# Patient Record
Sex: FEMALE | Race: WHITE | NOT HISPANIC OR LATINO | Employment: OTHER | URBAN - METROPOLITAN AREA
[De-identification: names, ages, dates, MRNs, and addresses within clinical notes are randomized per-mention and may not be internally consistent; named-entity substitution may affect disease eponyms.]

---

## 2017-02-05 ENCOUNTER — COMMUNICATION - HEALTHEAST (OUTPATIENT)
Dept: INTERNAL MEDICINE | Facility: CLINIC | Age: 82
End: 2017-02-05

## 2017-02-05 DIAGNOSIS — E78.5 HYPERLIPIDEMIA: ICD-10-CM

## 2017-02-08 ENCOUNTER — COMMUNICATION - HEALTHEAST (OUTPATIENT)
Dept: INTERNAL MEDICINE | Facility: CLINIC | Age: 82
End: 2017-02-08

## 2017-02-08 DIAGNOSIS — F41.9 ANXIETY: ICD-10-CM

## 2017-02-15 ENCOUNTER — COMMUNICATION - HEALTHEAST (OUTPATIENT)
Dept: INTERNAL MEDICINE | Facility: CLINIC | Age: 82
End: 2017-02-15

## 2017-02-15 ENCOUNTER — OFFICE VISIT - HEALTHEAST (OUTPATIENT)
Dept: INTERNAL MEDICINE | Facility: CLINIC | Age: 82
End: 2017-02-15

## 2017-02-15 DIAGNOSIS — Z95.1 H/O CORONARY ARTERY BYPASS SURGERY: ICD-10-CM

## 2017-02-15 DIAGNOSIS — F41.1 ANXIETY STATE: ICD-10-CM

## 2017-02-15 DIAGNOSIS — I10 BENIGN ESSENTIAL HTN: ICD-10-CM

## 2017-02-15 DIAGNOSIS — F32.5 MAJOR DEPRESSION IN FULL REMISSION (H): ICD-10-CM

## 2017-02-15 DIAGNOSIS — E78.5 HYPERLIPEMIA: ICD-10-CM

## 2017-02-15 LAB
CHOLEST SERPL-MCNC: 184 MG/DL
FASTING STATUS PATIENT QL REPORTED: NORMAL
HDLC SERPL-MCNC: 56 MG/DL
LDLC SERPL CALC-MCNC: 99 MG/DL
TRIGL SERPL-MCNC: 143 MG/DL

## 2017-02-15 ASSESSMENT — MIFFLIN-ST. JEOR: SCORE: 1279.83

## 2017-05-03 ENCOUNTER — COMMUNICATION - HEALTHEAST (OUTPATIENT)
Dept: INTERNAL MEDICINE | Facility: CLINIC | Age: 82
End: 2017-05-03

## 2017-05-03 DIAGNOSIS — F41.9 ANXIETY: ICD-10-CM

## 2017-06-16 ENCOUNTER — COMMUNICATION - HEALTHEAST (OUTPATIENT)
Dept: INTERNAL MEDICINE | Facility: CLINIC | Age: 82
End: 2017-06-16

## 2017-06-16 DIAGNOSIS — F32.5 MAJOR DEPRESSION IN FULL REMISSION (H): ICD-10-CM

## 2017-07-06 ENCOUNTER — COMMUNICATION - HEALTHEAST (OUTPATIENT)
Dept: INTERNAL MEDICINE | Facility: CLINIC | Age: 82
End: 2017-07-06

## 2017-07-06 DIAGNOSIS — I10 HTN (HYPERTENSION): ICD-10-CM

## 2017-08-09 ENCOUNTER — OFFICE VISIT - HEALTHEAST (OUTPATIENT)
Dept: INTERNAL MEDICINE | Facility: CLINIC | Age: 82
End: 2017-08-09

## 2017-08-09 DIAGNOSIS — Z95.1 H/O CORONARY ARTERY BYPASS SURGERY: ICD-10-CM

## 2017-08-09 DIAGNOSIS — E78.5 HYPERLIPEMIA: ICD-10-CM

## 2017-08-09 DIAGNOSIS — I10 ESSENTIAL HYPERTENSION WITH GOAL BLOOD PRESSURE LESS THAN 140/90: ICD-10-CM

## 2017-08-09 ASSESSMENT — MIFFLIN-ST. JEOR: SCORE: 1184.58

## 2017-08-10 ENCOUNTER — COMMUNICATION - HEALTHEAST (OUTPATIENT)
Dept: INTERNAL MEDICINE | Facility: CLINIC | Age: 82
End: 2017-08-10

## 2017-08-10 DIAGNOSIS — F41.9 ANXIETY: ICD-10-CM

## 2017-11-01 ENCOUNTER — COMMUNICATION - HEALTHEAST (OUTPATIENT)
Dept: INTERNAL MEDICINE | Facility: CLINIC | Age: 82
End: 2017-11-01

## 2017-11-01 DIAGNOSIS — E78.5 HYPERLIPIDEMIA: ICD-10-CM

## 2017-11-04 ENCOUNTER — COMMUNICATION - HEALTHEAST (OUTPATIENT)
Dept: INTERNAL MEDICINE | Facility: CLINIC | Age: 82
End: 2017-11-04

## 2017-11-04 DIAGNOSIS — F41.9 ANXIETY: ICD-10-CM

## 2017-12-02 ENCOUNTER — COMMUNICATION - HEALTHEAST (OUTPATIENT)
Dept: INTERNAL MEDICINE | Facility: CLINIC | Age: 82
End: 2017-12-02

## 2017-12-02 DIAGNOSIS — I10 HTN (HYPERTENSION): ICD-10-CM

## 2018-02-01 ENCOUNTER — COMMUNICATION - HEALTHEAST (OUTPATIENT)
Dept: INTERNAL MEDICINE | Facility: CLINIC | Age: 83
End: 2018-02-01

## 2018-02-01 DIAGNOSIS — F41.9 ANXIETY: ICD-10-CM

## 2018-03-03 ENCOUNTER — COMMUNICATION - HEALTHEAST (OUTPATIENT)
Dept: INTERNAL MEDICINE | Facility: CLINIC | Age: 83
End: 2018-03-03

## 2018-03-03 DIAGNOSIS — I10 HTN (HYPERTENSION): ICD-10-CM

## 2018-03-20 ENCOUNTER — COMMUNICATION - HEALTHEAST (OUTPATIENT)
Dept: INTERNAL MEDICINE | Facility: CLINIC | Age: 83
End: 2018-03-20

## 2018-03-20 DIAGNOSIS — F32.5 MAJOR DEPRESSION IN FULL REMISSION (H): ICD-10-CM

## 2018-04-24 ENCOUNTER — COMMUNICATION - HEALTHEAST (OUTPATIENT)
Dept: INTERNAL MEDICINE | Facility: CLINIC | Age: 83
End: 2018-04-24

## 2018-04-24 DIAGNOSIS — F41.9 ANXIETY: ICD-10-CM

## 2018-05-14 ENCOUNTER — OFFICE VISIT - HEALTHEAST (OUTPATIENT)
Dept: INTERNAL MEDICINE | Facility: CLINIC | Age: 83
End: 2018-05-14

## 2018-05-14 DIAGNOSIS — I10 HTN (HYPERTENSION): ICD-10-CM

## 2018-05-14 DIAGNOSIS — F41.9 ANXIETY: ICD-10-CM

## 2018-05-14 DIAGNOSIS — I25.10 ASCVD (ARTERIOSCLEROTIC CARDIOVASCULAR DISEASE): ICD-10-CM

## 2018-05-14 DIAGNOSIS — F32.5 MAJOR DEPRESSION IN FULL REMISSION (H): ICD-10-CM

## 2018-05-14 DIAGNOSIS — E78.5 HYPERLIPIDEMIA: ICD-10-CM

## 2018-05-14 LAB
ALBUMIN SERPL-MCNC: 3.7 G/DL (ref 3.5–5)
ALP SERPL-CCNC: 47 U/L (ref 45–120)
ALT SERPL W P-5'-P-CCNC: 12 U/L (ref 0–45)
ANION GAP SERPL CALCULATED.3IONS-SCNC: 10 MMOL/L (ref 5–18)
AST SERPL W P-5'-P-CCNC: 19 U/L (ref 0–40)
BILIRUB SERPL-MCNC: 0.5 MG/DL (ref 0–1)
BUN SERPL-MCNC: 7 MG/DL (ref 8–28)
CALCIUM SERPL-MCNC: 9.4 MG/DL (ref 8.5–10.5)
CHLORIDE BLD-SCNC: 96 MMOL/L (ref 98–107)
CHOLEST SERPL-MCNC: 158 MG/DL
CO2 SERPL-SCNC: 26 MMOL/L (ref 22–31)
CREAT SERPL-MCNC: 0.75 MG/DL (ref 0.6–1.1)
ERYTHROCYTE [DISTWIDTH] IN BLOOD BY AUTOMATED COUNT: 12.3 % (ref 11–14.5)
FASTING STATUS PATIENT QL REPORTED: NORMAL
GFR SERPL CREATININE-BSD FRML MDRD: >60 ML/MIN/1.73M2
GLUCOSE BLD-MCNC: 85 MG/DL (ref 70–125)
HCT VFR BLD AUTO: 41.6 % (ref 35–47)
HDLC SERPL-MCNC: 55 MG/DL
HGB BLD-MCNC: 14.5 G/DL (ref 12–16)
LDLC SERPL CALC-MCNC: 86 MG/DL
MCH RBC QN AUTO: 30.8 PG (ref 27–34)
MCHC RBC AUTO-ENTMCNC: 34.8 G/DL (ref 32–36)
MCV RBC AUTO: 89 FL (ref 80–100)
PLATELET # BLD AUTO: 202 THOU/UL (ref 140–440)
PMV BLD AUTO: 7.4 FL (ref 7–10)
POTASSIUM BLD-SCNC: 4.1 MMOL/L (ref 3.5–5)
PROT SERPL-MCNC: 6.5 G/DL (ref 6–8)
RBC # BLD AUTO: 4.69 MILL/UL (ref 3.8–5.4)
SODIUM SERPL-SCNC: 132 MMOL/L (ref 136–145)
TRIGL SERPL-MCNC: 85 MG/DL
TSH SERPL DL<=0.005 MIU/L-ACNC: 3.67 UIU/ML (ref 0.3–5)
WBC: 6.1 THOU/UL (ref 4–11)

## 2018-05-14 ASSESSMENT — MIFFLIN-ST. JEOR: SCORE: 1254.2

## 2018-05-15 ENCOUNTER — COMMUNICATION - HEALTHEAST (OUTPATIENT)
Dept: INTERNAL MEDICINE | Facility: CLINIC | Age: 83
End: 2018-05-15

## 2018-06-30 ENCOUNTER — COMMUNICATION - HEALTHEAST (OUTPATIENT)
Dept: INTERNAL MEDICINE | Facility: CLINIC | Age: 83
End: 2018-06-30

## 2018-06-30 DIAGNOSIS — I10 HTN (HYPERTENSION): ICD-10-CM

## 2018-07-27 ENCOUNTER — COMMUNICATION - HEALTHEAST (OUTPATIENT)
Dept: INTERNAL MEDICINE | Facility: CLINIC | Age: 83
End: 2018-07-27

## 2018-07-27 DIAGNOSIS — E78.5 HYPERLIPIDEMIA: ICD-10-CM

## 2018-10-24 ENCOUNTER — COMMUNICATION - HEALTHEAST (OUTPATIENT)
Dept: INTERNAL MEDICINE | Facility: CLINIC | Age: 83
End: 2018-10-24

## 2018-10-24 DIAGNOSIS — F41.9 ANXIETY: ICD-10-CM

## 2018-11-14 ENCOUNTER — OFFICE VISIT - HEALTHEAST (OUTPATIENT)
Dept: INTERNAL MEDICINE | Facility: CLINIC | Age: 83
End: 2018-11-14

## 2018-11-14 DIAGNOSIS — E78.49 OTHER HYPERLIPIDEMIA: ICD-10-CM

## 2018-11-14 DIAGNOSIS — F41.1 ANXIETY STATE: ICD-10-CM

## 2018-11-14 DIAGNOSIS — Z95.1 H/O CORONARY ARTERY BYPASS SURGERY: ICD-10-CM

## 2018-11-14 DIAGNOSIS — I10 ESSENTIAL HYPERTENSION: ICD-10-CM

## 2018-11-14 DIAGNOSIS — Z79.899 CONTROLLED SUBSTANCE AGREEMENT SIGNED: ICD-10-CM

## 2018-11-14 ASSESSMENT — MIFFLIN-ST. JEOR: SCORE: 1297.52

## 2019-01-10 ENCOUNTER — OFFICE VISIT - HEALTHEAST (OUTPATIENT)
Dept: INTERNAL MEDICINE | Facility: CLINIC | Age: 84
End: 2019-01-10

## 2019-01-10 DIAGNOSIS — F32.5 MAJOR DEPRESSIVE DISORDER IN FULL REMISSION, UNSPECIFIED WHETHER RECURRENT (H): ICD-10-CM

## 2019-01-10 DIAGNOSIS — Z51.81 MEDICATION MONITORING ENCOUNTER: ICD-10-CM

## 2019-01-10 DIAGNOSIS — F41.9 ANXIETY: ICD-10-CM

## 2019-01-10 DIAGNOSIS — E78.49 OTHER HYPERLIPIDEMIA: ICD-10-CM

## 2019-01-10 DIAGNOSIS — Z86.2 HISTORY OF SARCOIDOSIS: ICD-10-CM

## 2019-01-10 DIAGNOSIS — I10 ESSENTIAL HYPERTENSION: ICD-10-CM

## 2019-01-10 LAB
AMPHETAMINES UR QL SCN: NORMAL
BARBITURATES UR QL: NORMAL
BENZODIAZ UR QL: NORMAL
CANNABINOIDS UR QL SCN: NORMAL
COCAINE UR QL: NORMAL
CREAT UR-MCNC: 107.5 MG/DL
OPIATES UR QL SCN: NORMAL
OXYCODONE UR QL: NORMAL
PCP UR QL SCN: NORMAL

## 2019-01-10 ASSESSMENT — MIFFLIN-ST. JEOR: SCORE: 1293.89

## 2019-01-19 ENCOUNTER — COMMUNICATION - HEALTHEAST (OUTPATIENT)
Dept: SCHEDULING | Facility: CLINIC | Age: 84
End: 2019-01-19

## 2019-01-19 DIAGNOSIS — F41.9 ANXIETY: ICD-10-CM

## 2019-01-21 ENCOUNTER — AMBULATORY - HEALTHEAST (OUTPATIENT)
Dept: INTERNAL MEDICINE | Facility: CLINIC | Age: 84
End: 2019-01-21

## 2019-01-21 DIAGNOSIS — F32.5 MAJOR DEPRESSION IN FULL REMISSION (H): ICD-10-CM

## 2019-01-21 DIAGNOSIS — F41.9 ANXIETY: ICD-10-CM

## 2019-01-23 ENCOUNTER — COMMUNICATION - HEALTHEAST (OUTPATIENT)
Dept: INTERNAL MEDICINE | Facility: CLINIC | Age: 84
End: 2019-01-23

## 2019-01-31 ENCOUNTER — COMMUNICATION - HEALTHEAST (OUTPATIENT)
Dept: INTERNAL MEDICINE | Facility: CLINIC | Age: 84
End: 2019-01-31

## 2019-06-09 ENCOUNTER — COMMUNICATION - HEALTHEAST (OUTPATIENT)
Dept: INTERNAL MEDICINE | Facility: CLINIC | Age: 84
End: 2019-06-09

## 2019-06-09 DIAGNOSIS — F32.5 MAJOR DEPRESSION IN FULL REMISSION (H): ICD-10-CM

## 2019-06-29 ENCOUNTER — COMMUNICATION - HEALTHEAST (OUTPATIENT)
Dept: INTERNAL MEDICINE | Facility: CLINIC | Age: 84
End: 2019-06-29

## 2019-06-29 DIAGNOSIS — I10 HTN (HYPERTENSION): ICD-10-CM

## 2019-07-30 ENCOUNTER — COMMUNICATION - HEALTHEAST (OUTPATIENT)
Dept: INTERNAL MEDICINE | Facility: CLINIC | Age: 84
End: 2019-07-30

## 2019-07-30 DIAGNOSIS — F41.9 ANXIETY: ICD-10-CM

## 2019-10-13 ENCOUNTER — COMMUNICATION - HEALTHEAST (OUTPATIENT)
Dept: INTERNAL MEDICINE | Facility: CLINIC | Age: 84
End: 2019-10-13

## 2019-10-13 DIAGNOSIS — E78.5 HYPERLIPIDEMIA: ICD-10-CM

## 2019-10-22 ENCOUNTER — COMMUNICATION - HEALTHEAST (OUTPATIENT)
Dept: SCHEDULING | Facility: CLINIC | Age: 84
End: 2019-10-22

## 2019-10-22 DIAGNOSIS — F41.9 ANXIETY: ICD-10-CM

## 2019-10-25 ENCOUNTER — COMMUNICATION - HEALTHEAST (OUTPATIENT)
Dept: SCHEDULING | Facility: CLINIC | Age: 84
End: 2019-10-25

## 2019-12-05 ENCOUNTER — COMMUNICATION - HEALTHEAST (OUTPATIENT)
Dept: INTERNAL MEDICINE | Facility: CLINIC | Age: 84
End: 2019-12-05

## 2019-12-05 DIAGNOSIS — F32.5 MAJOR DEPRESSION IN FULL REMISSION (H): ICD-10-CM

## 2019-12-12 ENCOUNTER — COMMUNICATION - HEALTHEAST (OUTPATIENT)
Dept: INTERNAL MEDICINE | Facility: CLINIC | Age: 84
End: 2019-12-12

## 2019-12-12 DIAGNOSIS — I10 HTN (HYPERTENSION): ICD-10-CM

## 2020-01-10 ENCOUNTER — COMMUNICATION - HEALTHEAST (OUTPATIENT)
Dept: INTERNAL MEDICINE | Facility: CLINIC | Age: 85
End: 2020-01-10

## 2020-01-10 DIAGNOSIS — E78.5 HYPERLIPIDEMIA: ICD-10-CM

## 2020-01-22 ENCOUNTER — COMMUNICATION - HEALTHEAST (OUTPATIENT)
Dept: SCHEDULING | Facility: CLINIC | Age: 85
End: 2020-01-22

## 2020-01-22 DIAGNOSIS — F41.9 ANXIETY: ICD-10-CM

## 2020-01-29 ENCOUNTER — OFFICE VISIT - HEALTHEAST (OUTPATIENT)
Dept: INTERNAL MEDICINE | Facility: CLINIC | Age: 85
End: 2020-01-29

## 2020-01-29 DIAGNOSIS — I10 HTN (HYPERTENSION): ICD-10-CM

## 2020-01-29 DIAGNOSIS — Z79.899 MEDICATION MANAGEMENT: ICD-10-CM

## 2020-01-29 DIAGNOSIS — Z79.899 CHRONIC USE OF BENZODIAZEPINE FOR THERAPEUTIC PURPOSE: ICD-10-CM

## 2020-01-29 DIAGNOSIS — F32.5 MAJOR DEPRESSION IN FULL REMISSION (H): ICD-10-CM

## 2020-01-29 LAB
ALBUMIN SERPL-MCNC: 4.2 G/DL (ref 3.5–5)
ALP SERPL-CCNC: 55 U/L (ref 45–120)
ALT SERPL W P-5'-P-CCNC: 24 U/L (ref 0–45)
AMPHETAMINES UR QL SCN: NORMAL
ANION GAP SERPL CALCULATED.3IONS-SCNC: 10 MMOL/L (ref 5–18)
AST SERPL W P-5'-P-CCNC: 25 U/L (ref 0–40)
BARBITURATES UR QL: NORMAL
BASOPHILS # BLD AUTO: 0 THOU/UL (ref 0–0.2)
BASOPHILS NFR BLD AUTO: 1 % (ref 0–2)
BENZODIAZ UR QL: NORMAL
BILIRUB SERPL-MCNC: 0.4 MG/DL (ref 0–1)
BUN SERPL-MCNC: 14 MG/DL (ref 8–28)
CALCIUM SERPL-MCNC: 9.5 MG/DL (ref 8.5–10.5)
CANNABINOIDS UR QL SCN: NORMAL
CHLORIDE BLD-SCNC: 99 MMOL/L (ref 98–107)
CO2 SERPL-SCNC: 28 MMOL/L (ref 22–31)
COCAINE UR QL: NORMAL
CREAT SERPL-MCNC: 0.73 MG/DL (ref 0.6–1.1)
CREAT UR-MCNC: 93.3 MG/DL
EOSINOPHIL # BLD AUTO: 0.1 THOU/UL (ref 0–0.4)
EOSINOPHIL NFR BLD AUTO: 1 % (ref 0–6)
ERYTHROCYTE [DISTWIDTH] IN BLOOD BY AUTOMATED COUNT: 12.3 % (ref 11–14.5)
GFR SERPL CREATININE-BSD FRML MDRD: >60 ML/MIN/1.73M2
GLUCOSE BLD-MCNC: 100 MG/DL (ref 70–125)
HCT VFR BLD AUTO: 44.3 % (ref 35–47)
HGB BLD-MCNC: 15.1 G/DL (ref 12–16)
LYMPHOCYTES # BLD AUTO: 2 THOU/UL (ref 0.8–4.4)
LYMPHOCYTES NFR BLD AUTO: 28 % (ref 20–40)
MCH RBC QN AUTO: 31.3 PG (ref 27–34)
MCHC RBC AUTO-ENTMCNC: 34.1 G/DL (ref 32–36)
MCV RBC AUTO: 92 FL (ref 80–100)
MONOCYTES # BLD AUTO: 0.5 THOU/UL (ref 0–0.9)
MONOCYTES NFR BLD AUTO: 8 % (ref 2–10)
NEUTROPHILS # BLD AUTO: 4.2 THOU/UL (ref 2–7.7)
NEUTROPHILS NFR BLD AUTO: 61 % (ref 50–70)
OPIATES UR QL SCN: NORMAL
OXYCODONE UR QL: NORMAL
PCP UR QL SCN: NORMAL
PLATELET # BLD AUTO: 246 THOU/UL (ref 140–440)
PMV BLD AUTO: 10.2 FL (ref 8.5–12.5)
POTASSIUM BLD-SCNC: 4.3 MMOL/L (ref 3.5–5)
PROT SERPL-MCNC: 7.2 G/DL (ref 6–8)
RBC # BLD AUTO: 4.83 MILL/UL (ref 3.8–5.4)
SODIUM SERPL-SCNC: 137 MMOL/L (ref 136–145)
WBC: 6.9 THOU/UL (ref 4–11)

## 2020-01-29 ASSESSMENT — PATIENT HEALTH QUESTIONNAIRE - PHQ9: SUM OF ALL RESPONSES TO PHQ QUESTIONS 1-9: 0

## 2020-01-29 ASSESSMENT — MIFFLIN-ST. JEOR: SCORE: 1352.86

## 2020-03-16 ENCOUNTER — COMMUNICATION - HEALTHEAST (OUTPATIENT)
Dept: INTERNAL MEDICINE | Facility: CLINIC | Age: 85
End: 2020-03-16

## 2020-03-16 DIAGNOSIS — F32.5 MAJOR DEPRESSION IN FULL REMISSION (H): ICD-10-CM

## 2020-04-15 ENCOUNTER — COMMUNICATION - HEALTHEAST (OUTPATIENT)
Dept: INTERNAL MEDICINE | Facility: CLINIC | Age: 85
End: 2020-04-15

## 2020-04-15 ENCOUNTER — RECORDS - HEALTHEAST (OUTPATIENT)
Dept: SCHEDULING | Facility: CLINIC | Age: 85
End: 2020-04-15

## 2020-04-15 DIAGNOSIS — F41.9 ANXIETY: ICD-10-CM

## 2020-05-17 ENCOUNTER — COMMUNICATION - HEALTHEAST (OUTPATIENT)
Dept: INTERNAL MEDICINE | Facility: CLINIC | Age: 85
End: 2020-05-17

## 2020-05-17 DIAGNOSIS — I10 HTN (HYPERTENSION): ICD-10-CM

## 2020-05-19 RX ORDER — METOPROLOL SUCCINATE 50 MG/1
TABLET, EXTENDED RELEASE ORAL
Qty: 90 TABLET | Refills: 3 | Status: SHIPPED | OUTPATIENT
Start: 2020-05-19 | End: 2021-07-09

## 2020-07-16 ENCOUNTER — COMMUNICATION - HEALTHEAST (OUTPATIENT)
Dept: INTERNAL MEDICINE | Facility: CLINIC | Age: 85
End: 2020-07-16

## 2020-07-16 DIAGNOSIS — F41.9 ANXIETY: ICD-10-CM

## 2020-09-21 ENCOUNTER — RECORDS - HEALTHEAST (OUTPATIENT)
Dept: ADMINISTRATIVE | Facility: OTHER | Age: 85
End: 2020-09-21

## 2020-10-15 ENCOUNTER — COMMUNICATION - HEALTHEAST (OUTPATIENT)
Dept: INTERNAL MEDICINE | Facility: CLINIC | Age: 85
End: 2020-10-15

## 2020-10-15 DIAGNOSIS — F41.9 ANXIETY: ICD-10-CM

## 2020-10-19 ENCOUNTER — OFFICE VISIT - HEALTHEAST (OUTPATIENT)
Dept: INTERNAL MEDICINE | Facility: CLINIC | Age: 85
End: 2020-10-19

## 2020-10-19 DIAGNOSIS — G47.9 SLEEP DISORDER: ICD-10-CM

## 2020-10-19 DIAGNOSIS — F41.9 ANXIETY: ICD-10-CM

## 2020-10-19 RX ORDER — ZOLPIDEM TARTRATE 5 MG/1
5 TABLET ORAL
Qty: 30 TABLET | Refills: 0 | Status: SHIPPED | OUTPATIENT
Start: 2020-10-19 | End: 2022-07-28

## 2020-10-19 ASSESSMENT — PATIENT HEALTH QUESTIONNAIRE - PHQ9: SUM OF ALL RESPONSES TO PHQ QUESTIONS 1-9: 1

## 2020-12-16 ENCOUNTER — COMMUNICATION - HEALTHEAST (OUTPATIENT)
Dept: INTERNAL MEDICINE | Facility: CLINIC | Age: 85
End: 2020-12-16

## 2020-12-16 DIAGNOSIS — E78.5 HYPERLIPIDEMIA: ICD-10-CM

## 2020-12-17 RX ORDER — SIMVASTATIN 20 MG
TABLET ORAL
Qty: 90 TABLET | Refills: 3 | Status: SHIPPED | OUTPATIENT
Start: 2020-12-17 | End: 2022-03-09

## 2021-03-04 ENCOUNTER — AMBULATORY - HEALTHEAST (OUTPATIENT)
Dept: NURSING | Facility: CLINIC | Age: 86
End: 2021-03-04

## 2021-03-25 ENCOUNTER — AMBULATORY - HEALTHEAST (OUTPATIENT)
Dept: NURSING | Facility: CLINIC | Age: 86
End: 2021-03-25

## 2021-04-07 ENCOUNTER — COMMUNICATION - HEALTHEAST (OUTPATIENT)
Dept: INTERNAL MEDICINE | Facility: CLINIC | Age: 86
End: 2021-04-07

## 2021-04-07 DIAGNOSIS — F41.9 ANXIETY: ICD-10-CM

## 2021-04-08 RX ORDER — LORAZEPAM 1 MG/1
TABLET ORAL
Qty: 90 TABLET | Refills: 0 | Status: SHIPPED | OUTPATIENT
Start: 2021-04-08 | End: 2021-07-06

## 2021-04-10 ENCOUNTER — COMMUNICATION - HEALTHEAST (OUTPATIENT)
Dept: INTERNAL MEDICINE | Facility: CLINIC | Age: 86
End: 2021-04-10

## 2021-04-10 DIAGNOSIS — I10 HTN (HYPERTENSION): ICD-10-CM

## 2021-04-12 RX ORDER — HYDROCHLOROTHIAZIDE 25 MG/1
TABLET ORAL
Qty: 90 TABLET | Refills: 3 | Status: SHIPPED | OUTPATIENT
Start: 2021-04-12 | End: 2022-03-09

## 2021-05-26 ASSESSMENT — PATIENT HEALTH QUESTIONNAIRE - PHQ9: SUM OF ALL RESPONSES TO PHQ QUESTIONS 1-9: 0

## 2021-05-27 ASSESSMENT — PATIENT HEALTH QUESTIONNAIRE - PHQ9: SUM OF ALL RESPONSES TO PHQ QUESTIONS 1-9: 1

## 2021-05-29 NOTE — TELEPHONE ENCOUNTER
Refill Approved    Rx renewed per Medication Renewal Policy. Medication was last renewed on 5/14/18, last OV 1/10/19.    Britta Minor, [facility] Triage/Med Refill 6/9/2019     Requested Prescriptions   Pending Prescriptions Disp Refills     sertraline (ZOLOFT) 100 MG tablet [Pharmacy Med Name: SERTRALINE  MG TABLET] 90 tablet 0     Sig: TAKE 1 TABLET BY MOUTH DAILY       SSRI Refill Protocol  Passed - 6/9/2019  9:38 AM        Passed - PCP or prescribing provider visit in last year     Last office visit with prescriber/PCP: 11/14/2018 Lan Mares MD OR same dept: 1/10/2019 Lan Diop MD OR same specialty: 1/10/2019 Lan Diop MD  Last physical: Visit date not found Last MTM visit: Visit date not found   Next visit within 3 mo: Visit date not found  Next physical within 3 mo: Visit date not found  Prescriber OR PCP: Lan Mares MD  Last diagnosis associated with med order: 1. 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

## 2021-05-30 VITALS — WEIGHT: 179.9 LBS | BODY MASS INDEX: 28.91 KG/M2 | HEIGHT: 66 IN

## 2021-05-30 NOTE — TELEPHONE ENCOUNTER
"Controlled Substance Refill Request  Medication Name:   Requested Prescriptions     Pending Prescriptions Disp Refills     LORazepam (ATIVAN) 1 MG tablet 90 tablet 0     Sig: TAKE 1 TABLET (1 MG TOTAL) BY MOUTH DAILY AS NEEDED FOR ANXIETY.     Date Last Fill: 1/23/2018  Pharmacy: [facility] [id number] [facility] - [place], [place] - [address]      Submit electronically to pharmacy  Controlled Substance Agreement Date Scanned:   Encounter-Level CSA Scan Date - 08/09/2017:    Scan on 8/11/2017  1:46 PM (below)             Encounter-Level CSA Scan Date - 08/24/2016:    Scan on 8/30/2016  7:39 AM (below)         Last office visit with prescriber/PCP: 1/10/2019 Lan Diop MD OR same dept: 1/10/2019 Lan Diop MD OR same specialty: 1/10/2019 Lan Diop MD  Last physical: Visit date not found Last MTM visit: Visit date not found          The patient is requesting an expedited refill due to being out of the medication. The patient states that she has called three times for the medication since 7/22/2019 and wants the refill request \"speeded up.\"  "

## 2021-05-30 NOTE — TELEPHONE ENCOUNTER
RN cannot approve Refill Request    RN can NOT refill this medication Protocol failed and NO refill given.       Lore Carrera, [facility] Triage/Med Refill 6/29/2019    Requested Prescriptions   Pending Prescriptions Disp Refills     hydroCHLOROthiazide (HYDRODIURIL) 25 MG tablet [Pharmacy Med Name: HYDROCHLOROTHIAZIDE 25 MG TAB] 90 tablet 3     Sig: TAKE 1 TABLET BY MOUTH EVERY DAY       Diuretics/Combination Diuretics Refill Protocol  Failed - 6/29/2019  8:30 AM        Failed - Serum Potassium in past 12 months      No results found for: LN-POTASSIUM          Failed - Serum Sodium in past 12 months      No results found for: LN-SODIUM          Failed - Serum Creatinine in past 12 months      Creatinine   Date Value Ref Range Status   05/14/2018 0.75 0.60 - 1.10 mg/dL Final             Passed - Visit with PCP or prescribing provider visit in past 12 months     Last office visit with prescriber/PCP: 11/14/2018 Lan Mares MD OR same dept: 1/10/2019 Lan Diop MD OR same specialty: 1/10/2019 Lan Diop MD  Last physical: Visit date not found Last MTM visit: Visit date not found   Next visit within 3 mo: Visit date not found  Next physical within 3 mo: Visit date not found  Prescriber OR PCP: Lan Mares MD  Last diagnosis associated with med order: 1. HTN (hypertension)  - hydroCHLOROthiazide (HYDRODIURIL) 25 MG tablet [Pharmacy Med Name: HYDROCHLOROTHIAZIDE 25 MG TAB]; TAKE 1 TABLET BY MOUTH EVERY DAY  Dispense: 90 tablet; Refill: 3  - metoprolol succinate (TOPROL-XL) 50 MG 24 hr tablet; TAKE 1 TABLET BY MOUTH EVERY DAY  Dispense: 90 tablet; Refill: 1    If protocol passes may refill for 12 months if within 3 months of last provider visit (or a total of 15 months).             Passed - Blood pressure on file in past 12 months     BP Readings from Last 1 Encounters:   01/10/19 138/80           Signed Prescriptions Disp Refills    metoprolol succinate (TOPROL-XL) 50 MG 24 hr tablet 90  tablet 1     Sig: TAKE 1 TABLET BY MOUTH EVERY DAY       Beta-Blockers Refill Protocol Passed - 6/29/2019  8:30 AM        Passed - PCP or prescribing provider visit in past 12 months or next 3 months     Last office visit with prescriber/PCP: 11/14/2018 Lan Mares MD OR same dept: 1/10/2019 Lan Diop MD OR same specialty: 1/10/2019 Lan Diop MD  Last physical: Visit date not found Last MTM visit: Visit date not found   Next visit within 3 mo: Visit date not found  Next physical within 3 mo: Visit date not found  Prescriber OR PCP: Lan Mares MD  Last diagnosis associated with med order: 1. HTN (hypertension)  - hydroCHLOROthiazide (HYDRODIURIL) 25 MG tablet [Pharmacy Med Name: HYDROCHLOROTHIAZIDE 25 MG TAB]; TAKE 1 TABLET BY MOUTH EVERY DAY  Dispense: 90 tablet; Refill: 3  - metoprolol succinate (TOPROL-XL) 50 MG 24 hr tablet; TAKE 1 TABLET BY MOUTH EVERY DAY  Dispense: 90 tablet; Refill: 1    If protocol passes may refill for 12 months if within 3 months of last provider visit (or a total of 15 months).             Passed - Blood pressure filed in past 12 months     BP Readings from Last 1 Encounters:   01/10/19 138/80

## 2021-05-30 NOTE — TELEPHONE ENCOUNTER
Refill Approved    Rx renewed per Medication Renewal Policy. Medication was last renewed on 5/14/18.    Lore Carrera, [facility] Triage/Med Refill 6/29/2019     Requested Prescriptions   Pending Prescriptions Disp Refills     hydroCHLOROthiazide (HYDRODIURIL) 25 MG tablet [Pharmacy Med Name: HYDROCHLOROTHIAZIDE 25 MG TAB] 90 tablet 3     Sig: TAKE 1 TABLET BY MOUTH EVERY DAY       Diuretics/Combination Diuretics Refill Protocol  Failed - 6/29/2019  8:30 AM        Failed - Serum Potassium in past 12 months      No results found for: LN-POTASSIUM          Failed - Serum Sodium in past 12 months      No results found for: LN-SODIUM          Failed - Serum Creatinine in past 12 months      Creatinine   Date Value Ref Range Status   05/14/2018 0.75 0.60 - 1.10 mg/dL Final             Passed - Visit with PCP or prescribing provider visit in past 12 months     Last office visit with prescriber/PCP: 11/14/2018 Lan Mares MD OR same dept: 1/10/2019 Lan Diop MD OR same specialty: 1/10/2019 Lan Diop MD  Last physical: Visit date not found Last MTM visit: Visit date not found   Next visit within 3 mo: Visit date not found  Next physical within 3 mo: Visit date not found  Prescriber OR PCP: Lan Mares MD  Last diagnosis associated with med order: 1. HTN (hypertension)  - hydroCHLOROthiazide (HYDRODIURIL) 25 MG tablet [Pharmacy Med Name: HYDROCHLOROTHIAZIDE 25 MG TAB]; TAKE 1 TABLET BY MOUTH EVERY DAY  Dispense: 90 tablet; Refill: 3  - metoprolol succinate (TOPROL-XL) 50 MG 24 hr tablet [Pharmacy Med Name: METOPROLOL SUCC ER 50 MG TAB]; TAKE 1 TABLET BY MOUTH EVERY DAY  Dispense: 90 tablet; Refill: 3    If protocol passes may refill for 12 months if within 3 months of last provider visit (or a total of 15 months).             Passed - Blood pressure on file in past 12 months     BP Readings from Last 1 Encounters:   01/10/19 138/80             metoprolol succinate (TOPROL-XL) 50 MG 24 hr  tablet [Pharmacy Med Name: METOPROLOL SUCC ER 50 MG TAB] 90 tablet 3     Sig: TAKE 1 TABLET BY MOUTH EVERY DAY       Beta-Blockers Refill Protocol Passed - 6/29/2019  8:30 AM        Passed - PCP or prescribing provider visit in past 12 months or next 3 months     Last office visit with prescriber/PCP: 11/14/2018 Lan Mares MD OR same dept: 1/10/2019 Lan Diop MD OR same specialty: 1/10/2019 Lan Diop MD  Last physical: Visit date not found Last MTM visit: Visit date not found   Next visit within 3 mo: Visit date not found  Next physical within 3 mo: Visit date not found  Prescriber OR PCP: Lan Mares MD  Last diagnosis associated with med order: 1. HTN (hypertension)  - hydroCHLOROthiazide (HYDRODIURIL) 25 MG tablet [Pharmacy Med Name: HYDROCHLOROTHIAZIDE 25 MG TAB]; TAKE 1 TABLET BY MOUTH EVERY DAY  Dispense: 90 tablet; Refill: 3  - metoprolol succinate (TOPROL-XL) 50 MG 24 hr tablet [Pharmacy Med Name: METOPROLOL SUCC ER 50 MG TAB]; TAKE 1 TABLET BY MOUTH EVERY DAY  Dispense: 90 tablet; Refill: 3    If protocol passes may refill for 12 months if within 3 months of last provider visit (or a total of 15 months).             Passed - Blood pressure filed in past 12 months     BP Readings from Last 1 Encounters:   01/10/19 138/80

## 2021-05-31 VITALS — HEIGHT: 66 IN | BODY MASS INDEX: 25.54 KG/M2 | WEIGHT: 158.9 LBS

## 2021-06-01 VITALS — WEIGHT: 174.25 LBS | BODY MASS INDEX: 28 KG/M2 | HEIGHT: 66 IN

## 2021-06-02 VITALS — HEIGHT: 66 IN | WEIGHT: 183.8 LBS | BODY MASS INDEX: 29.54 KG/M2

## 2021-06-02 VITALS — BODY MASS INDEX: 29.41 KG/M2 | WEIGHT: 183 LBS | HEIGHT: 66 IN

## 2021-06-02 NOTE — TELEPHONE ENCOUNTER
Controlled Substance Refill Request  Medication Name:   Requested Prescriptions     Pending Prescriptions Disp Refills     LORazepam (ATIVAN) 1 MG tablet 90 tablet 0     Sig: TAKE 1 TABLET (1 MG TOTAL) BY MOUTH DAILY AS NEEDED FOR ANXIETY.     Date Last Fill: 7/30/19  Pharmacy: [id number]      Submit electronically to pharmacy  Controlled Substance Agreement Date Scanned:   Encounter-Level CSA Scan Date - 08/09/2017:    Scan on 8/11/2017  1:46 PM           Encounter-Level CSA Scan Date - 08/24/2016:    Scan on 8/30/2016  7:39 AM       Last office visit with prescriber/PCP: 1/10/2019 Lan Diop MD OR same dept: Visit date not found OR same specialty: Visit date not found  Last physical: Visit date not found Last MTM visit: Visit date not found

## 2021-06-02 NOTE — TELEPHONE ENCOUNTER
Medication: Lorazepam  Last Date Filled 7/30/19   pulled: YES    Only PCP Prescribing? : YES  Taken as prescribed from physician notes? YES    CSA in last year: YES  Random Utox in last year: YES  (if any of the above answer NO - schedule with PCP)     Opioids + benzodiazepines? YES  (if the above answer YES - schedule with PCP every 6 months)     Is patient on the Executive Review Team? no      All responses suggest: Refilling prescription

## 2021-06-02 NOTE — TELEPHONE ENCOUNTER
Refill Approved    Rx renewed per Medication Renewal Policy. Medication was last renewed on 7/27/19, last OV 1/10/19    Britta Minor, [facility] Triage/Med Refill 10/13/2019     Requested Prescriptions   Pending Prescriptions Disp Refills     simvastatin (ZOCOR) 20 MG tablet [Pharmacy Med Name: SIMVASTATIN 20 MG TABLET] 90 tablet 4     Sig: TAKE 1 TABLET (20 MG TOTAL) BY MOUTH EVERY EVENING.       Statins Refill Protocol (Hmg CoA Reductase Inhibitors) Passed - 10/13/2019  9:04 AM        Passed - PCP or prescribing provider visit in past 12 months      Last office visit with prescriber/PCP: 11/14/2018 Lan Mares MD OR same dept: 1/10/2019 Lan Diop MD OR same specialty: 1/10/2019 Lan Diop MD  Last physical: Visit date not found Last MTM visit: Visit date not found   Next visit within 3 mo: Visit date not found  Next physical within 3 mo: Visit date not found  Prescriber OR PCP: Lan Mares MD  Last diagnosis associated with med order: 1. Hyperlipidemia  - simvastatin (ZOCOR) 20 MG tablet [Pharmacy Med Name: SIMVASTATIN 20 MG TABLET]; Take 1 tablet (20 mg total) by mouth every evening.  Dispense: 90 tablet; Refill: 4    If protocol passes may refill for 12 months if within 3 months of last provider visit (or a total of 15 months).

## 2021-06-02 NOTE — TELEPHONE ENCOUNTER
RN Triage:     Calling in to see if her refill for lorazepam has been sent in.     Patient advised that it was sent and confirmed pharmacy.     Nano Fritz RN, BSN [facility] Triage Nurse

## 2021-06-03 ENCOUNTER — COMMUNICATION - HEALTHEAST (OUTPATIENT)
Dept: INTERNAL MEDICINE | Facility: CLINIC | Age: 86
End: 2021-06-03

## 2021-06-03 DIAGNOSIS — F32.5 MAJOR DEPRESSION IN FULL REMISSION (H): ICD-10-CM

## 2021-06-03 RX ORDER — SERTRALINE HYDROCHLORIDE 100 MG/1
TABLET, FILM COATED ORAL
Qty: 90 TABLET | Refills: 1 | Status: SHIPPED | OUTPATIENT
Start: 2021-06-03 | End: 2021-10-06

## 2021-06-04 VITALS
WEIGHT: 196 LBS | OXYGEN SATURATION: 98 % | HEART RATE: 72 BPM | DIASTOLIC BLOOD PRESSURE: 84 MMHG | HEIGHT: 66 IN | SYSTOLIC BLOOD PRESSURE: 120 MMHG | BODY MASS INDEX: 31.5 KG/M2

## 2021-06-04 NOTE — TELEPHONE ENCOUNTER
Refill Approved    Rx renewed per Medication Renewal Policy. Medication was last renewed on 6/9/19.    Lore Carrera, [facility] Triage/Med Refill 12/5/2019     Requested Prescriptions   Pending Prescriptions Disp Refills     sertraline (ZOLOFT) 100 MG tablet 90 tablet 1     Sig: Take 1 tablet (100 mg total) by mouth daily.       SSRI Refill Protocol  Passed - 12/5/2019  2:35 AM        Passed - PCP or prescribing provider visit in last year     Last office visit with prescriber/PCP: 1/10/2019 Lan Diop MD OR same dept: 1/10/2019 Lan Diop MD OR same specialty: 1/10/2019 Lan Diop MD  Last physical: Visit date not found Last MTM visit: Visit date not found   Next visit within 3 mo: Visit date not found  Next physical within 3 mo: Visit date not found  Prescriber OR PCP: Lan Diop MD  Last diagnosis associated with med order: 1. 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  Dispense: 90 tablet; Refill: 1    If protocol passes may refill for 12 months if within 3 months of last provider visit (or a total of 15 months).

## 2021-06-04 NOTE — TELEPHONE ENCOUNTER
Refill Approved    Rx renewed per Medication Renewal Policy. Medication was last renewed on 6/29/19.    Jamaica Dewey, [facility] Triage/Med Refill 12/14/2019     Requested Prescriptions   Pending Prescriptions Disp Refills     metoprolol succinate (TOPROL-XL) 50 MG 24 hr tablet 90 tablet 1     Sig: Take 1 tablet (50 mg total) by mouth daily.       Beta-Blockers Refill Protocol Passed - 12/12/2019 12:09 PM        Passed - PCP or prescribing provider visit in past 12 months or next 3 months     Last office visit with prescriber/PCP: 1/10/2019 Lan Diop MD OR same dept: 1/10/2019 Lan Diop MD OR same specialty: 1/10/2019 Lan Diop MD  Last physical: Visit date not found Last MTM visit: Visit date not found   Next visit within 3 mo: Visit date not found  Next physical within 3 mo: Visit date not found  Prescriber OR PCP: Lan Diop MD  Last diagnosis associated with med order: 1. HTN (hypertension)  - metoprolol succinate (TOPROL-XL) 50 MG 24 hr tablet; Take 1 tablet (50 mg total) by mouth daily.  Dispense: 90 tablet; Refill: 1    If protocol passes may refill for 12 months if within 3 months of last provider visit (or a total of 15 months).             Passed - Blood pressure filed in past 12 months     BP Readings from Last 1 Encounters:   01/10/19 138/80

## 2021-06-05 NOTE — TELEPHONE ENCOUNTER
RN cannot approve Refill Request    RN can NOT refill this medication Protocol failed and NO refill given.    Lore Carrera, [facility] Triage/Med Refill 1/13/2020    Requested Prescriptions   Pending Prescriptions Disp Refills     simvastatin (ZOCOR) 20 MG tablet [Pharmacy Med Name: SIMVASTATIN 20 MG TABLET] 90 tablet 3     Sig: TAKE 1 TABLET (20 MG TOTAL) BY MOUTH EVERY EVENING.       Statins Refill Protocol (Hmg CoA Reductase Inhibitors) Failed - 1/10/2020  1:59 AM        Failed - PCP or prescribing provider visit in past 12 months      Last office visit with prescriber/PCP: 11/14/2018 Lan Mares MD OR same dept: 1/10/2019 Lan Diop MD OR same specialty: 1/10/2019 Lan Diop MD  Last physical: Visit date not found Last MTM visit: Visit date not found   Next visit within 3 mo: Visit date not found  Next physical within 3 mo: Visit date not found  Prescriber OR PCP: Lan Mares MD  Last diagnosis associated with med order: 1. Hyperlipidemia  - simvastatin (ZOCOR) 20 MG tablet [Pharmacy Med Name: SIMVASTATIN 20 MG TABLET]; Take 1 tablet (20 mg total) by mouth every evening.  Dispense: 90 tablet; Refill: 0    If protocol passes may refill for 12 months if within 3 months of last provider visit (or a total of 15 months).

## 2021-06-05 NOTE — TELEPHONE ENCOUNTER
Controlled Substance Refill Request  Medication Name:   Requested Prescriptions     Pending Prescriptions Disp Refills     LORazepam (ATIVAN) 1 MG tablet [Pharmacy Med Name: LORAZEPAM 1 MG TABLET] 90 tablet 0     Sig: TAKE 1 TABLET BY MOUTH DAILY AS NEEDED FOR ANXIETY     Date Last Fill: 10/23/19  [facility]ed [facility]  Submit electronically to pharmacy  Controlled Substance Agreement on file:   Encounter-Level CSA Scan Date - 11/14/2018:    Scan on 11/19/2018  2:34 PM           Encounter-Level CSA Scan Date - 08/09/2017:    Scan on 8/11/2017  1:46 PM           Encounter-Level CSA Scan Date - 08/24/2016:    Scan on 8/30/2016  7:39 AM        Last office visit:  1/10/19

## 2021-06-05 NOTE — TELEPHONE ENCOUNTER
FD Please help schedule pt for med check up with pcp   No additional refills until pt is seen        Rx set up with start date 1/25/2020 since pt is not due until the 27th

## 2021-06-05 NOTE — TELEPHONE ENCOUNTER
FYI - Status Update  Who is Calling: Patient  Update: I just want to request this to be sent ASAP as this can take a very long time.  Okay to leave a detailed message?:  Yes

## 2021-06-05 NOTE — PROGRESS NOTES
ASSESSMENT AND PLAN:    1. HTN (hypertension)  refill  - metoprolol succinate (TOPROL-XL) 50 MG 24 hr tablet; Take 1 tablet (50 mg total) by mouth daily.  Dispense: 90 tablet; Refill: 0  - hydroCHLOROthiazide (HYDRODIURIL) 25 MG tablet; Take 1 tablet (25 mg total) by mouth daily.  Dispense: 90 tablet; Refill: 3    2. Depression with anxiety, in full remission    refill  - sertraline (ZOLOFT) 100 MG tablet; Take 1 tablet (100 mg total) by mouth daily.  Dispense: 90 tablet; Refill: 0    3. 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    - Drugs of Abuse 1,Urine    Patient Instructions   1.  reviewed her medications, no changes recommended.     2. Follow up in 6 months.     CHIEF COMPLAINT:  Chief Complaint   Patient presents with     Medication Management     Need Updated CSA/ Pend Utox     HISTORY OF PRESENT ILLNESS:  Tila Levine is a 84 y.o. female here in follow up.  Overall doing well.  Does continue to use lorazepam 0.5 mg po daily for sleep and there is no increasing the dose.  The sertraline has helped her depression and anxiety and so she does not need the lorazepam for panic episodes.      REVIEW OF SYSTEMS:   See HPI, all other systems on review are negative.    Past Medical History:   Diagnosis Date     Anxiety      Depression      Hypertension      Polycythemia     remote hx     Skin cancer      Uncomplicated bereavement 2012    Truman MALCOLM/psychiatrist; end stage dementia     Social History     Tobacco Use   Smoking Status Never Smoker   Smokeless Tobacco Never Used     Family History   Problem Relation Age of Onset     Congenital heart disease Mother      Congenital heart disease Father      No Medical Problems Son      No Medical Problems Sister      No Medical Problems Sister      No Medical Problems Son      Schizophrenia Son      No Medical Problems Daughter      Past Surgical History:   Procedure  "Laterality Date     CORONARY ARTERY BYPASS GRAFT  2010    LAD     TONSILLECTOMY      Description: Tonsillectomy With Adenoidectomy;  Recorded: 03/11/2008;     VITALS:  Vitals:    01/29/20 1112   BP: 120/84   Patient Site: Left Arm   Patient Position: Sitting   Cuff Size: Adult Large   Pulse: 72   SpO2: 98%   Weight: 196 lb (88.9 kg)   Height: 5' 5.5\" (1.664 m)     Wt Readings from Last 3 Encounters:   01/29/20 196 lb (88.9 kg)   01/10/19 183 lb (83 kg)   11/14/18 183 lb 12.8 oz (83.4 kg)     PHYSICAL EXAM:  Constitutional:  In NAD, alert and oriented  Cardiac:  Pulse is regular   Psychiatric:  Mood and behavior appropriate, thinking is clear.     Current Outpatient Medications   Medication Sig Dispense Refill     aspirin 81 MG EC tablet Take 81 mg by mouth daily.       CALCIUM-MAGNESIUM-ZINC ORAL Take by mouth.       cholecalciferol, vitamin D3, (VITAMIN D3) 4,000 unit cap Take 1 tablet by mouth daily.       cyanocobalamin, vitamin B-12, 2,500 mcg Tab Take by mouth.       hydroCHLOROthiazide (HYDRODIURIL) 25 MG tablet Take 1 tablet (25 mg total) by mouth daily. 90 tablet 3     LORazepam (ATIVAN) 1 MG tablet TAKE 1 TABLET BY MOUTH DAILY AS NEEDED FOR ANXIETY 90 tablet 0     melatonin 5 mg Tab Take 1 tablet by mouth daily.       metoprolol succinate (TOPROL-XL) 50 MG 24 hr tablet Take 1 tablet (50 mg total) by mouth daily. 90 tablet 0     OMEGA-3/DHA/EPA/FISH OIL (FISH OIL-OMEGA-3 FATTY ACIDS) 300-1,000 mg capsule Take 2 g by mouth daily.       sertraline (ZOLOFT) 100 MG tablet Take 1 tablet (100 mg total) by mouth daily. 90 tablet 0     simvastatin (ZOCOR) 20 MG tablet TAKE 1 TABLET (20 MG TOTAL) BY MOUTH EVERY EVENING. 90 tablet 3     Lan Diop MD  Internal Medicine  [facility]  "

## 2021-06-07 NOTE — TELEPHONE ENCOUNTER
Pharmacy calling to request refill of lorazepam for patient.  Routing request to PCP for review.    Controlled Substance Refill Request  Medication Name:   Requested Prescriptions     Pending Prescriptions Disp Refills     LORazepam (ATIVAN) 1 MG tablet 90 tablet 0     Sig: TAKE 1 TABLET BY MOUTH DAILY AS NEEDED FOR ANXIETY     Date Last Fill:1/22/2020  [facility]ed [facility]  Submit electronically to pharmacy  Controlled Substance Agreement on file:   Encounter-Level CSA Scan Date - 11/14/2018:    Scan on 11/19/2018  2:34 PM           Encounter-Level CSA Scan Date - 08/09/2017:    Scan on 8/11/2017  1:46 PM           Encounter-Level CSA Scan Date - 08/24/2016:    Scan on 8/30/2016  7:39 AM        Last office visit: 1/29/2020

## 2021-06-08 NOTE — PROGRESS NOTES
Tila Levine  1935      Assessment and Plan:  1. CAD- remarkably stable same meds asymptomatic  2. hperlipemia- excellent response to statin; see profile below  3. Anxiety- on zoloft  4. At age 81 not interested in screening procedures  5. Same meds- RTC 6 months; routine labs today      Chief Complaint: f/u med review/multiple    Visit diagnoses:    1. Benign essential HTN     2. Hyperlipemia  Comprehensive Metabolic Panel    HM2(CBC w/o Differential)    Lipid Cascade    Thyroid Stimulating Hormone (TSH)   3. 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 Anxiety     5. H/O coronary artery bypass surgery       Patient Active Problem List   Diagnosis     Vitamin D Deficiency     Hyperglycemia     Polycythemia Vera     Hyperlipidemia     Hypertension     Anxiety     Major depression in full remission     Insomnia     H/O coronary artery bypass surgery     Past Medical History:   Diagnosis Date     Anxiety      Depression      Hypertension      Polycythemia     remote hx     Skin cancer      Uncomplicated bereavement 2012    Truman MALCOLM/psychiatrist; end stage dementia     Past Surgical History:   Procedure Laterality Date     CORONARY ARTERY BYPASS GRAFT  2010    LAD     open heart  2012     NE CABG, VEIN, SINGLE      Description: CABG (CABG);  Proc Date: 05/19/2010;     NE REMOVE TONSILS/ADENOIDS,<13 Y/O      Description: Tonsillectomy With Adenoidectomy;  Recorded: 03/11/2008;     Social History     Social History     Marital status:      Spouse name: Truman MALCOLM     Number of children: 5     Years of education: N/A     Occupational History     RN Retired     [employer]     Social History Main Topics     Smoking status: Never Smoker     Smokeless tobacco: Not on file     Alcohol use No     Drug use: No     Sexual activity: Not on file     Other Topics Concern     Not on file     Social History Narrative     2012 (Truman- dementia/etohism) Grew up in [place]  [place]... 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 Met  Truman MALCOLM when he was in Med School in [place] and emigrated to [place] where Truman started psychiatric practice.  Several grown kids including Ronnell, suad Wolfe- psych in [place]. Non smoker/non drinker. 5 grown kids/      Family History   Problem Relation Age of Onset     Congenital heart disease Mother      Congenital heart disease Father      No Medical Problems Son      No Medical Problems Sister      No Medical Problems Sister      No Medical Problems Son      No Medical Problems Son      No Medical Problems Daughter        Meds:  Current Outpatient Prescriptions   Medication Sig Dispense Refill     aspirin 81 MG EC tablet Take 81 mg by mouth daily.       cholecalciferol, vitamin D3, (VITAMIN D3) 4,000 unit cap Take 1 tablet by mouth daily.       cyanocobalamin (VITAMIN B-12) 250 MCG tablet Take 250 mcg by mouth daily.       hydroCHLOROthiazide (HYDRODIURIL) 25 MG tablet TAKE 1 TABLET BY MOUTH EVERY DAY 90 tablet 3     LORazepam (ATIVAN) 1 MG tablet TAKE 1 TABLET BY MOUTH DAILY AS NEEDED FOR ANXIETY. 90 tablet 0     melatonin 5 mg Tab Take 1 tablet by mouth daily.       metoprolol succinate (TOPROL-XL) 50 MG 24 hr tablet TAKE 1 TABLET BY MOUTH DAILY 90 tablet 3     nitroglycerin (NITROSTAT) 0.3 MG SL tablet Place 0.3 mg under the tongue every 5 (five) minutes as needed for chest pain.       OMEGA-3/DHA/EPA/FISH OIL (FISH OIL-OMEGA-3 FATTY ACIDS) 300-1,000 mg capsule Take 2 g by mouth daily.       sertraline (ZOLOFT) 100 MG tablet TAKE 1 TABLET BY MOUTH DAILY 90 tablet 3     simvastatin (ZOCOR) 20 MG tablet TAKE 1 TABLET (20 MG TOTAL) BY MOUTH EVERY EVENING. 90 tablet 2     No current facility-administered medications for this visit.        No Known Allergies    ROS: complete review of symptoms otherwise negative except as noted below    S:  Doing  "well overall. Enjoys her books and reading. Some family issues with alcohol problems but she deals well with it. Strong + FH etoh. No symptoms of concern     O:   Vitals:    02/15/17 0902   BP: 126/78   Patient Site: Left Arm   Patient Position: Sitting   Cuff Size: Adult Regular   Pulse: 60   Resp: 16   Weight: 179 lb 14.4 oz (81.6 kg)   Height: 5' 5.5\" (1.664 m)       Physical Exam:  General-  VS- see above  HEENT- neg   Neck- no adenopathy/thyromegaly/bruits  Chest- clear ; sternotomy scar  Cor- reg no murmurs/gallops/ectopics  Abdomen- soft non tender, no masses; no organomegaly  Extremities: no edema, good distal pulses  Neuro- Cr. NN-  intact, alert, mentally seems very sharp  Skin- no suspicious lesions for malignancy    Recent Results (from the past 240 hour(s))   Comprehensive Metabolic Panel   Result Value Ref Range    Sodium 137 136 - 145 mmol/L    Potassium 4.1 3.5 - 5.0 mmol/L    Chloride 99 98 - 107 mmol/L    CO2 26 22 - 31 mmol/L    Anion Gap, Calculation 12 5 - 18 mmol/L    Glucose 90 70 - 125 mg/dL    BUN 12 8 - 28 mg/dL    Creatinine 0.87 0.60 - 1.10 mg/dL    GFR MDRD Af Amer >60 >60 mL/min/1.73m2    GFR MDRD Non Af Amer >60 >60 mL/min/1.73m2    Bilirubin, Total 0.5 0.0 - 1.0 mg/dL    Calcium 10.8 (H) 8.5 - 10.5 mg/dL    Protein, Total 6.8 6.0 - 8.0 g/dL    Albumin 4.1 3.5 - 5.0 g/dL    Alkaline Phosphatase 53 45 - 120 U/L    AST 23 0 - 40 U/L    ALT 17 0 - 45 U/L   HM2(CBC w/o Differential)   Result Value Ref Range    WBC 6.7 4.0 - 11.0 thou/uL    RBC 4.63 3.80 - 5.40 mill/uL    Hemoglobin 14.4 12.0 - 16.0 g/dL    Hematocrit 41.9 35.0 - 47.0 %    MCV 91 80 - 100 fL    MCH 31.1 27.0 - 34.0 pg    MCHC 34.3 32.0 - 36.0 g/dL    RDW 11.8 11.0 - 14.5 %    Platelets 238 140 - 440 thou/uL    MPV 7.4 7.0 - 10.0 fL   Lipid Cascade   Result Value Ref Range    Cholesterol 184 <=199 mg/dL    Triglycerides 143 <=149 mg/dL    HDL Cholesterol 56 >=50 mg/dL    LDL Calculated 99 <=129 mg/dL    Patient Fasting > " 8hrs? Unknown    Thyroid Stimulating Hormone (TSH)   Result Value Ref Range    TSH 5.45 (H) 0.30 - 5.00 uIU/mL         Lan Mares MD

## 2021-06-08 NOTE — TELEPHONE ENCOUNTER
Refill Approved    Rx renewed per Medication Renewal Policy. Medication was last renewed on 1/29/20.    Lore Carrera, [facility] Triage/Med Refill 5/19/2020     Requested Prescriptions   Pending Prescriptions Disp Refills     metoprolol succinate (TOPROL-XL) 50 MG 24 hr tablet [Pharmacy Med Name: METOPROLOL SUCC ER 50 MG TAB] 90 tablet 0     Sig: TAKE 1 TABLET BY MOUTH EVERY DAY       Beta-Blockers Refill Protocol Passed - 5/17/2020  9:08 AM        Passed - PCP or prescribing provider visit in past 12 months or next 3 months     Last office visit with prescriber/PCP: 1/29/2020 Lan Diop MD OR same dept: 1/29/2020 Lan Diop MD OR same specialty: 1/29/2020 Lan Diop MD  Last physical: Visit date not found Last MTM visit: Visit date not found   Next visit within 3 mo: Visit date not found  Next physical within 3 mo: Visit date not found  Prescriber OR PCP: Lan Diop MD  Last diagnosis associated with med order: 1. HTN (hypertension)  - metoprolol succinate (TOPROL-XL) 50 MG 24 hr tablet [Pharmacy Med Name: METOPROLOL SUCC ER 50 MG TAB]; TAKE 1 TABLET BY MOUTH EVERY DAY  Dispense: 90 tablet; Refill: 0    If protocol passes may refill for 12 months if within 3 months of last provider visit (or a total of 15 months).             Passed - Blood pressure filed in past 12 months     BP Readings from Last 1 Encounters:   01/29/20 120/84

## 2021-06-09 NOTE — TELEPHONE ENCOUNTER
Controlled Substance Refill Request  Medication Name:   Requested Prescriptions     Pending Prescriptions Disp Refills     LORazepam (ATIVAN) 1 MG tablet [Pharmacy Med Name: LORAZEPAM 1 MG TABLET] 90 tablet 0     Sig: TAKE 1 TABLET BY MOUTH EVERY DAY AS NEEDED FOR ANXIETY     Date Last Fill: 4/20/2020  Requested Pharmacy: [facility]  Submit electronically to pharmacy  Controlled Substance Agreement on file:   Encounter-Level CSA Scan Date - 11/14/2018:    Scan on 11/19/2018  2:34 PM           Encounter-Level CSA Scan Date - 08/09/2017:    Scan on 8/11/2017  1:46 PM           Encounter-Level CSA Scan Date - 08/24/2016:    Scan on 8/30/2016  7:39 AM        Last office visit:  1/29/2020

## 2021-06-09 NOTE — TELEPHONE ENCOUNTER
Patient Returning Call  Reason for call:  Patient called back.  Information relayed to patient:  n/a  Patient has additional questions:  Yes  If YES, what are your questions/concerns:  Calling on the status of this medication. Would like filed today.  Okay to leave a detailed message?: Yes

## 2021-06-09 NOTE — TELEPHONE ENCOUNTER
FYI - Status Update  Who is Calling: Patient  Update: Patient is calling back to check on the status of the message below. Patient stated that she is very upset about this and is expecting this be done today. Patient is also expecting a call from the clinic today.  Okay to leave a detailed message?:  No

## 2021-06-12 NOTE — PROGRESS NOTES
"Tila Levine  1935      Assessment and Plan:  1. HTN/CAD stable- remarkably so; high functional status  2. Recent intentional wgt loss ~ 20lbs. Feels well  3. Labs from last visit reviewed. See below  4. HTN stable       Chief Complaint: f/u    Visit diagnoses:    1. Essential hypertension with goal blood pressure less than 140/90     2. Hyperlipemia     3. H/O coronary artery bypass surgery         Meds:  Current Outpatient Prescriptions   Medication Sig Dispense Refill     aspirin 81 MG EC tablet Take 81 mg by mouth daily.       cholecalciferol, vitamin D3, (VITAMIN D3) 4,000 unit cap Take 1 tablet by mouth daily.       cyanocobalamin (VITAMIN B-12) 250 MCG tablet Take 250 mcg by mouth daily.       hydroCHLOROthiazide (HYDRODIURIL) 25 MG tablet TAKE 1 TABLET BY MOUTH EVERY DAY 90 tablet 3     LORazepam (ATIVAN) 1 MG tablet TAKE 1 TABLET BY MOUTH DAILY AS NEEDED FOR ANXIETY. 90 tablet 0     melatonin 5 mg Tab Take 1 tablet by mouth daily.       metoprolol succinate (TOPROL-XL) 50 MG 24 hr tablet TAKE 1 TABLET BY MOUTH DAILY 90 tablet 3     nitroglycerin (NITROSTAT) 0.3 MG SL tablet Place 0.3 mg under the tongue every 5 (five) minutes as needed for chest pain.       OMEGA-3/DHA/EPA/FISH OIL (FISH OIL-OMEGA-3 FATTY ACIDS) 300-1,000 mg capsule Take 2 g by mouth daily.       sertraline (ZOLOFT) 100 MG tablet TAKE 1 TABLET BY MOUTH DAILY 90 tablet 3     simvastatin (ZOCOR) 20 MG tablet TAKE 1 TABLET (20 MG TOTAL) BY MOUTH EVERY EVENING. 90 tablet 2     No current facility-administered medications for this visit.        No Known Allergies    ROS: complete review of symptoms otherwise negative except as noted below    S:  Doing wonderful. Just celebrated 82th birthday with family. No symptoms. She's lost weight and pleased about it. No pain issues. \"reluctant\" [place] patient but does indeed seem to be well       O:   Vitals:    08/09/17 0920   BP: 136/82   Patient Site: Left Arm   Patient Position: Sitting   Cuff " "Size: Adult Regular   Pulse: 78   Weight: 158 lb 14.4 oz (72.1 kg)   Height: 5' 5.5\" (1.664 m)       Physical Exam:  General-  VS- see above; looks younger than stated age; quick witted amusing affect  HEENT- neg   Neck- no adenopathy/thyromegaly/bruits  Chest- clear   Cor- reg no murmurs/gallops/ectopics; sternotomy scar   Abdomen- soft non tender, no masses; no organomegaly  Extremities: no edema, good distal pulses      Lab Results   Component Value Date    HGBA1C 5.5 02/10/2016     Lab Results   Component Value Date    CHOL 184 02/15/2017    CHOL 175 08/24/2016    CHOL 270 (H) 02/10/2016     Lab Results   Component Value Date    HDL 56 02/15/2017    HDL 61 08/24/2016    HDL 59 02/10/2016     Lab Results   Component Value Date    LDLCALC 99 02/15/2017    LDLCALC 91 08/24/2016    LDLCALC 187 (H) 02/10/2016     Lab Results   Component Value Date    TRIG 143 02/15/2017    TRIG 115 08/24/2016    TRIG 119 02/10/2016     No components found for: CHOLHDL   Results for orders placed or performed in visit on 02/15/17   Comprehensive Metabolic Panel   Result Value Ref Range    Sodium 137 136 - 145 mmol/L    Potassium 4.1 3.5 - 5.0 mmol/L    Chloride 99 98 - 107 mmol/L    CO2 26 22 - 31 mmol/L    Anion Gap, Calculation 12 5 - 18 mmol/L    Glucose 90 70 - 125 mg/dL    BUN 12 8 - 28 mg/dL    Creatinine 0.87 0.60 - 1.10 mg/dL    GFR MDRD Af Amer >60 >60 mL/min/1.73m2    GFR MDRD Non Af Amer >60 >60 mL/min/1.73m2    Bilirubin, Total 0.5 0.0 - 1.0 mg/dL    Calcium 10.8 (H) 8.5 - 10.5 mg/dL    Protein, Total 6.8 6.0 - 8.0 g/dL    Albumin 4.1 3.5 - 5.0 g/dL    Alkaline Phosphatase 53 45 - 120 U/L    AST 23 0 - 40 U/L    ALT 17 0 - 45 U/L         Lan Mares MD              "

## 2021-06-12 NOTE — PROGRESS NOTES
"Tila Levine is a 85 y.o. female who is being evaluated via a billable telephone visit.      The patient has been notified of following:     \"This telephone visit will be conducted via a call between you and your physician/provider. We have found that certain health care needs can be provided without the need for a physical exam.  This service lets us provide the care you need with a short phone conversation.  If a prescription is necessary we can send it directly to your pharmacy.  If lab work is needed we can place an order for that and you can then stop by our lab to have the test done at a later time.    Telephone visits are billed at different rates depending on your insurance coverage. During this emergency period, for some insurers they may be billed the same as an in-person visit.  Please reach out to your insurance provider with any questions.    If during the course of the call the physician/provider feels a telephone visit is not appropriate, you will not be charged for this service.\"    Patient has given verbal consent to a Telephone visit? Yes by Niurka Bianchi CMA    What phone number would you like to be contacted at? [phone]    Additional provider notes: she is doing well.  Following social distancing very carefully  Unable to exercise as much as she is in so much.  She uses the lorazepam every evening, and occasionally during the day.  No significant alcohol.  Lorazepam used to help her sleep now it is not, and she can't fall asleep until about 4 am.  Wants to try ambien.  Used it in the past, and her physician daughter recommends it for her.      Assessment/Plan:  1. Anxiety  Refilled.    - LORazepam (ATIVAN) 1 MG tablet; Take 1 tablet (1 mg total) by mouth daily as needed for anxiety.  Dispense: 90 tablet; Refill: 0    2. Sleep disorder  Will give trial of ambien.  Explained its use and avoiding increase in lorazepam.  Follow up when Covid risks are lower.  Might try to get her to try " selective serotonin reuptake inhibitor.  She has been reluctant in the past.   - zolpidem (AMBIEN) 5 MG tablet; Take 1 tablet (5 mg total) by mouth at bedtime as needed for sleep.  Dispense: 30 tablet; Refill: 0    Phone call duration:  11 minutes    Lan Diop MD

## 2021-06-13 NOTE — TELEPHONE ENCOUNTER
Refill Approved    Rx renewed per Medication Renewal Policy. Medication was last renewed on 1/13/20.    Lore Carrera, [facility] Triage/Med Refill 12/17/2020     Requested Prescriptions   Pending Prescriptions Disp Refills     simvastatin (ZOCOR) 20 MG tablet [Pharmacy Med Name: SIMVASTATIN 20 MG TABLET] 90 tablet 3     Sig: TAKE 1 TABLET BY MOUTH EVERY DAY IN THE EVENING       Statins Refill Protocol (Hmg CoA Reductase Inhibitors) Passed - 12/16/2020 12:21 AM        Passed - PCP or prescribing provider visit in past 12 months      Last office visit with prescriber/PCP: 11/14/2018 Lan Mares MD OR same dept: 1/29/2020 Lan Diop MD OR same specialty: 1/29/2020 Lan Diop MD  Last physical: Visit date not found Last MTM visit: Visit date not found   Next visit within 3 mo: Visit date not found  Next physical within 3 mo: Visit date not found  Prescriber OR PCP: Lan Mares MD  Last diagnosis associated with med order: 1. Hyperlipidemia  - simvastatin (ZOCOR) 20 MG tablet [Pharmacy Med Name: SIMVASTATIN 20 MG TABLET]; TAKE 1 TABLET BY MOUTH EVERY DAY IN THE EVENING  Dispense: 90 tablet; Refill: 3    If protocol passes may refill for 12 months if within 3 months of last provider visit (or a total of 15 months).

## 2021-06-16 PROBLEM — Z79.899 CONTROLLED SUBSTANCE AGREEMENT SIGNED: Chronic | Status: ACTIVE | Noted: 2018-11-14

## 2021-06-16 PROBLEM — G47.9 SLEEP DISORDER: Status: ACTIVE | Noted: 2020-10-19

## 2021-06-16 PROBLEM — Z86.2 HISTORY OF SARCOIDOSIS: Status: ACTIVE | Noted: 2019-01-10

## 2021-06-16 NOTE — TELEPHONE ENCOUNTER
Prescription Monitoring Program activity reviewed with no discrepancies noted.      Last fill per : 1/14/21  Quantity/days supply: 90 tabs x 90 days     Controlled Substance Agreement on file: Yes  Date: 1/29/20    Last office visit with provider:  10/19/20    Please advise.

## 2021-06-16 NOTE — TELEPHONE ENCOUNTER
Telephone Encounter by Niurka Joaquin CMA at 1/22/2020 12:06 PM     Author: Niurka Joaquin CMA Service: -- Author Type: Certified Medical Assistant    Filed: 1/22/2020 12:11 PM Encounter Date: 1/22/2020 Status: Signed    : Niurka Joaquin CMA (Certified Medical Assistant)       Medication: Lorazepam   Last Date Filled 10/27/19   pulled: YES    Only PCP Prescribing? : YES  Taken as prescribed from physician notes? YES    CSA in last year: YES  Random Utox in last year: NO  (if any of the above answer NO - schedule with PCP)     Opioids + benzodiazepines? NO  (if the above answer YES - schedule with PCP every 6 months)     Is patient on the Executive Review Team? NO      All responses suggest: Scheduling with PCP for further intervention     Return in about 3 months (around 4/10/2019) for Recheck hypertension.

## 2021-06-16 NOTE — TELEPHONE ENCOUNTER
Controlled Substance Refill Request  Medication Name:   Requested Prescriptions     Pending Prescriptions Disp Refills     LORazepam (ATIVAN) 1 MG tablet [Pharmacy Med Name: LORAZEPAM 1 MG TABLET] 90 tablet 0     Sig: TAKE 1 TABLET BY MOUTH DAILY AS NEEDED FOR ANXIETY     Date Last Fill: 10/19/20  [facility]ed [facility]  Submit electronically to pharmacy  Controlled Substance Agreement on file:   Encounter-Level CSA Scan Date - 11/14/2018:    Scan on 11/19/2018  2:34 PM           Encounter-Level CSA Scan Date - 08/09/2017:    Scan on 8/11/2017  1:46 PM           Encounter-Level CSA Scan Date - 08/24/2016:    Scan on 8/30/2016  7:39 AM        Last office visit:  10/19/20

## 2021-06-16 NOTE — TELEPHONE ENCOUNTER
RN cannot approve Refill Request    RN can NOT refill this medication PCP messaged that patient is overdue for Office Visit. Last office visit: 1/29/2020 Lan Diop MD Last Physical: Visit date not found Last MTM visit: Visit date not found Last visit same specialty: 1/29/2020 Lan Diop MD.  Next visit within 3 mo: Visit date not found  Next physical within 3 mo: Visit date not found      Marta Jasmine, [facility] Triage/Med Refill 4/10/2021    Requested Prescriptions   Pending Prescriptions Disp Refills     hydroCHLOROthiazide (HYDRODIURIL) 25 MG tablet [Pharmacy Med Name: HYDROCHLOROTHIAZIDE 25 MG TAB] 90 tablet 3     Sig: TAKE 1 TABLET BY MOUTH EVERY DAY       Diuretics/Combination Diuretics Refill Protocol  Failed - 4/10/2021  9:10 AM        Failed - Serum Potassium in past 12 months      No results found for: LN-POTASSIUM          Failed - Serum Sodium in past 12 months      No results found for: LN-SODIUM          Failed - Blood pressure on file in past 12 months     BP Readings from Last 1 Encounters:   01/29/20 120/84             Failed - Serum Creatinine in past 12 months      Creatinine   Date Value Ref Range Status   01/29/2020 0.73 0.60 - 1.10 mg/dL Final             Passed - Visit with PCP or prescribing provider visit in past 12 months     Last office visit with prescriber/PCP: 1/29/2020 Lan Diop MD OR same dept: Visit date not found OR same specialty: 1/29/2020 Lan Diop MD  Last physical: Visit date not found Last MTM visit: Visit date not found   Next visit within 3 mo: Visit date not found  Next physical within 3 mo: Visit date not found  Prescriber OR PCP: Lan Diop MD  Last diagnosis associated with med order: 1. HTN (hypertension)  - hydroCHLOROthiazide (HYDRODIURIL) 25 MG tablet [Pharmacy Med Name: HYDROCHLOROTHIAZIDE 25 MG TAB]; TAKE 1 TABLET BY MOUTH EVERY DAY  Dispense: 90 tablet; Refill: 3    If protocol passes may refill for 12 months if within  3 months of last provider visit (or a total of 15 months).

## 2021-06-16 NOTE — TELEPHONE ENCOUNTER
Telephone Encounter by Dea Alvarado CMA at 7/30/2019  1:38 PM     Author: Dea Alvarado CMA Service: -- Author Type: Certified Medical Assistant    Filed: 7/30/2019  1:41 PM Encounter Date: 7/30/2019 Status: Signed    : Dea Alvarado CMA (Certified Medical Assistant)       Medication: Lorazepam  Last Date Filled 4/21/2019 for 90 tablets   pulled: YES     Only PCP Prescribing? : YES  Taken as prescribed from physician notes? YES- 1/10/2019- 1. Medication monitoring encounter  She signed CSA.  Continues to use lorazepam for anxiety, sparingly without escalation.  I explained our monitoring process.    - LORazepam (ATIVAN) 1 MG tablet; TAKE 1 TABLET (1 MG TOTAL) BY MOUTH DAILY AS NEEDED FOR ANXIETY.  Dispense: 90 tablet; Refill: 0  - Drugs of Abuse 1,Urine    CSA in last year: YES  Random Utox in last year: YES  (if any of the above answer NO - schedule with PCP)     Opioids + benzodiazepines? NO    Is patient on the Executive Review Team? NO      All responses suggest: Refilling prescription

## 2021-06-16 NOTE — TELEPHONE ENCOUNTER
Telephone Encounter by Lubna Ron CMA at 10/22/2019  5:04 PM     Author: Lubna Ron CMA Service: -- Author Type: Certified Medical Assistant    Filed: 10/22/2019  5:06 PM Encounter Date: 10/22/2019 Status: Signed    : Lubna Ron CMA (Certified Medical Assistant)

## 2021-06-17 NOTE — TELEPHONE ENCOUNTER
Telephone Encounter by Amy Mantilla LPN at 7/17/2020 11:52 AM     Author: Amy Mantilla LPN Service: -- Author Type: Licensed Nurse    Filed: 7/17/2020 11:59 AM Encounter Date: 7/16/2020 Status: Signed    : Amy Mantilla LPN (Licensed Nurse)       Medication: Lorazepam  Last Date Filled 4/21/20   pulled: YES         Only PCP Prescribing? : YES  Taken as prescribed from physician notes? YES    CSA in last year: YES  Random Utox in last year: YES  (if any of the above answer NO - schedule with PCP)     Opioids + benzodiazepines? NO  (if the above answer YES - schedule with PCP every 6 months)     Is patient on the Executive Review Team? no      All responses suggest: Refilling prescription

## 2021-06-17 NOTE — TELEPHONE ENCOUNTER
Telephone Encounter by Niurka Joaquin CMA at 10/16/2020  8:07 AM     Author: Niurka Joaquin CMA Service: -- Author Type: Certified Medical Assistant    Filed: 10/16/2020  8:10 AM Encounter Date: 10/15/2020 Status: Signed    : Niurka Joaquin CMA (Certified Medical Assistant)       Medication: Lorazepam   Last Date Filled 7/21/2020   pulled: YES    Only PCP Prescribing? : YES  Taken as prescribed from physician notes? YES    CSA in last year: YES  Random Utox in last year: YES  (if any of the above answer NO - schedule with PCP)     Opioids + benzodiazepines? NO  (if the above answer YES - schedule with PCP every 6 months)     Is patient on the Executive Review Team? NO      All responses suggest: Scheduling with PCP for further intervention

## 2021-06-17 NOTE — TELEPHONE ENCOUNTER
Telephone Encounter by Niurka Joaquin CMA at 4/15/2020  3:10 PM     Author: Niurka Joaquin CMA Service: -- Author Type: Certified Medical Assistant    Filed: 4/15/2020  3:18 PM Encounter Date: 4/15/2020 Status: Signed    : Niurka Joaquin CMA (Certified Medical Assistant)       Medication: Lorazepam   Last Date Filled 1/23/20   pulled: YES    Only PCP Prescribing? : YES  Taken as prescribed from physician notes? RX SET UP FOR START DATE OF 4/20/2020    CSA in last year: YES  Random Utox in last year: YES  (if any of the above answer NO - schedule with PCP)     Opioids + benzodiazepines? NO  (if the above answer YES - schedule with PCP every 6 months)     Is patient on the Executive Review Team? NO      All responses suggest: Refilling prescription      Return in about 6 months (around 7/29/2020) for Recheck, hypertension.

## 2021-06-18 NOTE — PROGRESS NOTES
Tila Levine  1935      Assessment and Plan:  1 hypertension under control  2 history of coronary artery disease status post bypass grafting 2010 essentially asymptomatic  3 chronic anxiety stable on current medications  4 hyperlipidemia stable on simvastatin  5 routine labs today; return to clinic 6 months or so  6 very high functioning independent 82-year-old  7 return to clinic 6 months or so sooner if any problems      Chief Complaint: Follow-up multiple    Visit diagnoses:    1. HTN (hypertension)  hydroCHLOROthiazide (HYDRODIURIL) 25 MG tablet    metoprolol succinate (TOPROL-XL) 50 MG 24 hr tablet   2. Anxiety  LORazepam (ATIVAN) 1 MG tablet   3. 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 Hyperlipidemia  simvastatin (ZOCOR) 20 MG tablet    Comprehensive Metabolic Panel    HM2(CBC w/o Differential)    Lipid Cascade    Thyroid Stimulating Hormone (TSH)   5. ASCVD (arteriosclerotic cardiovascular disease)  nitroglycerin (NITROSTAT) 0.3 MG SL tablet     Patient Active Problem List   Diagnosis     Vitamin D Deficiency     Hyperglycemia     Polycythemia Vera     Hyperlipidemia     Hypertension     Anxiety     Major depression in full remission     Insomnia     H/O coronary artery bypass surgery     Past Medical History:   Diagnosis Date     Anxiety      Depression      Hypertension      Polycythemia     remote hx     Skin cancer      Uncomplicated bereavement 2012    Truman MALCOLM/psychiatrist; end stage dementia     Past Surgical History:   Procedure Laterality Date     CORONARY ARTERY BYPASS GRAFT  2010    LAD     open heart  2012     CT CABG, VEIN, SINGLE      Description: CABG (CABG);  Proc Date: 05/19/2010;     CT REMOVE TONSILS/ADENOIDS,<13 Y/O      Description: Tonsillectomy With Adenoidectomy;  Recorded: 03/11/2008;     Social History     Social History     Marital status:      Spouse name: Truman MALCOLM     Number of children: 5     Years of education: N/A      Occupational History     RN Retired     [employer]     Social History Main Topics     Smoking status: Never Smoker     Smokeless tobacco: Never Used     Alcohol use No     Drug use: No     Sexual activity: Not on file     Other Topics Concern     Not on file     Social History Narrative     2012 (Truman- dementia/etohism) Grew up in [facility]... 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 3 sisters/dad  surgeon. Met  Truman MALCOLM when he was in Med School in [place] and emigrated to [place] where Truman started psychiatric practice.  Several grown kids including Ronnell, suad Wolfe- psych in [place]. Non smoker/non drinker. 5 grown kids/      Family History   Problem Relation Age of Onset     Congenital heart disease Mother      Congenital heart disease Father      No Medical Problems Son      No Medical Problems Sister      No Medical Problems Sister      No Medical Problems Son      No Medical Problems Son      No Medical Problems Daughter        Meds:  Current Outpatient Prescriptions   Medication Sig Dispense Refill     aspirin 81 MG EC tablet Take 81 mg by mouth daily.       CALCIUM-MAGNESIUM-ZINC ORAL Take by mouth.       cholecalciferol, vitamin D3, (VITAMIN D3) 4,000 unit cap Take 1 tablet by mouth daily.       cyanocobalamin, vitamin B-12, 2,500 mcg Tab Take by mouth.       hydroCHLOROthiazide (HYDRODIURIL) 25 MG tablet Take 1 tablet (25 mg total) by mouth daily. 90 tablet 0     LORazepam (ATIVAN) 1 MG tablet Take 1 tablet (1 mg total) by mouth daily as needed for anxiety. 90 tablet 0     melatonin 5 mg Tab Take 1 tablet by mouth daily.       metoprolol succinate (TOPROL-XL) 50 MG 24 hr tablet Take 1 tablet (50 mg total) by mouth daily. 90 tablet 3     nitroglycerin (NITROSTAT) 0.3 MG SL tablet Place 1 tablet (0.3 mg total) under the tongue every 5 (five) minutes as needed for chest pain. 25 tablet 6      "OMEGA-3/DHA/EPA/FISH OIL (FISH OIL-OMEGA-3 FATTY ACIDS) 300-1,000 mg capsule Take 2 g by mouth daily.       sertraline (ZOLOFT) 100 MG tablet Take 1 tablet (100 mg total) by mouth daily. 90 tablet 2     simvastatin (ZOCOR) 20 MG tablet Take 1 tablet (20 mg total) by mouth every evening. 90 tablet 2     No current facility-administered medications for this visit.        No Known Allergies    ROS: complete review of symptoms otherwise negative except as noted below    S: She is here today for general follow-up and medication review she is doing remarkably well overall.  Jokes about the fact that her daughter no clear had suggested that Tila prepay for her cremation.  She laughs about this.  She is not having any problems with medications no symptoms of concern her anxiety is under control       O:   Vitals:    05/14/18 1046   BP: 134/80   Patient Site: Left Arm   Patient Position: Sitting   Cuff Size: Adult Regular   Pulse: 60   Resp: 16   SpO2: 97%   Weight: 174 lb 4 oz (79 kg)   Height: 5' 5.5\" (1.664 m)       Physical Exam:  General-very pleasant healthy-appearing woman with significant mental sharpness and wit  VS- see above  HEENT- neg   Neck- no adenopathy/thyromegaly/bruits  Chest- clear   Cor- reg no murmurs/gallops/ectopics  Extremities: no edema, good distal pulses  Neuro- Cr. NN-  intact, alert,     Recent Results (from the past 240 hour(s))   Comprehensive Metabolic Panel   Result Value Ref Range    Sodium 132 (L) 136 - 145 mmol/L    Potassium 4.1 3.5 - 5.0 mmol/L    Chloride 96 (L) 98 - 107 mmol/L    CO2 26 22 - 31 mmol/L    Anion Gap, Calculation 10 5 - 18 mmol/L    Glucose 85 70 - 125 mg/dL    BUN 7 (L) 8 - 28 mg/dL    Creatinine 0.75 0.60 - 1.10 mg/dL    GFR MDRD Af Amer >60 >60 mL/min/1.73m2    GFR MDRD Non Af Amer >60 >60 mL/min/1.73m2    Bilirubin, Total 0.5 0.0 - 1.0 mg/dL    Calcium 9.4 8.5 - 10.5 mg/dL    Protein, Total 6.5 6.0 - 8.0 g/dL    Albumin 3.7 3.5 - 5.0 g/dL    Alkaline Phosphatase 47 " 45 - 120 U/L    AST 19 0 - 40 U/L    ALT 12 0 - 45 U/L   HM2(CBC w/o Differential)   Result Value Ref Range    WBC 6.1 4.0 - 11.0 thou/uL    RBC 4.69 3.80 - 5.40 mill/uL    Hemoglobin 14.5 12.0 - 16.0 g/dL    Hematocrit 41.6 35.0 - 47.0 %    MCV 89 80 - 100 fL    MCH 30.8 27.0 - 34.0 pg    MCHC 34.8 32.0 - 36.0 g/dL    RDW 12.3 11.0 - 14.5 %    Platelets 202 140 - 440 thou/uL    MPV 7.4 7.0 - 10.0 fL   Lipid Cascade   Result Value Ref Range    Cholesterol 158 <=199 mg/dL    Triglycerides 85 <=149 mg/dL    HDL Cholesterol 55 >=50 mg/dL    LDL Calculated 86 <=129 mg/dL    Patient Fasting > 8hrs? Unknown    Thyroid Stimulating Hormone (TSH)   Result Value Ref Range    TSH 3.67 0.30 - 5.00 uIU/mL               Lan Mares MD

## 2021-06-18 NOTE — LETTER
Letter by Lan Diop MD at      Author: Lan Diop MD Service: -- Author Type: --    Filed:  Encounter Date: 1/10/2019 Status: (Other)         [facility] INTERNAL MEDICINE  01/10/19    Patient: Tila Levine  YOB: 1935  Medical Record Number: [id number]  CSN: [id number]                                                                              Non-opioid Controlled Substance Agreement    I understand that my care provider has prescribed a controlled substance to help manage my condition(s). I am taking this medicine to help me function or work. I know this is strong medicine, and that it can cause serious side effects. Controlled substances can be sedating, addicting and may cause a dependency on the drug. They can affect my ability to drive or think, and cause depression. They need to be taken exactly as prescribed. Combining controlled substances with certain medicines or chemicals (such as cocaine, sedatives and tranquilizers, sleeping pills, meth) can be dangerous or even fatal. Also, if I stop controlled substances suddenly, I may have severe withdrawal symptoms.  If not helpful, I may be asked to stop them.    The risks, benefits, and side effects of these medicine(s) were explained to me. I agree that:    1. I will take part in other treatments as advised by my care team. This may be psychiatry or counseling, physical therapy, behavioral therapy, group treatment or a referral to a pain clinic. I will reduce or stop my medicine when my care team tells me to do so.  2. I will take my medicines as prescribed. I will not change the dose or schedule unless my care team tells me to. There will be no refills if I run out early.  I may be contactedwithout warning and asked to complete a urine drug test or pill count at any time.   3. I will keep all my appointments, and understand this is part of the monitoring of controlled substances. My care team may require an office visit for EVERY  controlled substance refill. If I miss appointments or dont follow instructions, my care team may stop my medicine.  4. I will not ask other providers to prescribe controlled substances, and I will not accept controlled substances from other people. If I need another prescribed controlled substance for a new reason, I will tell my care team within 1 business day.  5. I will use one pharmacy to fill all of my controlled substance prescriptions, and it is up to me to make sure that I do not run out of my medicines on weekends or holidays. If my care team is willing to refill my controlled substance prescription without a visit, I must request refills only during office hours, refills may take up to 3 days to process, and it may take up to 5 to 7 days for my medicine to be mailed and ready at my pharmacy. Prescriptions will not be mailed anywhere except my pharmacy.    6. I am responsible for my prescriptions. If the medicine/prescription is lost or stolen, it will not be replaced. I also agree not to share controlled substance medicines with anyone.          [facility] INTERNAL MEDICINE  01/10/19  Patient:  Tila Levine  YOB: 1935  Medical Record Number: [id number]  CSN: [id number]    7. I agree to not use ANY illegal or recreational drugs. This includes marijuana, cocaine, bath salts or other drugs. I agree not to use alcohol unless my care team says I may. I agree to give urine samples whenever asked. If I dont give a urine sample, the care team may stop my medicine.    8. If I enroll in the [place] Medical Marijuana program, I will tell my care team. I will also sign an agreement to share my medical records with my care team.    9. I will bring in my list of medicines (or my medicine bottles) each time I come to the clinic.   10. I will tell my care team right away if I become pregnant or have a new medical problem treated outside of my regular clinic.  11. I understand that this medicine can affect my  thinking and judgment. It may be unsafe for me to drive, use machinery and do dangerous tasks. I will not do any of these things until I know how the medicine affects me. If my dose changes, I will wait to see how it affects me. I will contact my care team if I have concerns about medicine side effects.    I understand that if I do not follow any of the conditions above, my prescriptions or treatment may be stopped.      I agree that my provider, clinic care team, and pharmacy may work with any city, state or federal law enforcement agency that investigates the misuse, sale, or other diversion of my controlled medicine. I will allow my provider to discuss my care with or share a copy of this agreement with any other treating provider, pharmacy or emergency room where I receive care. I agree to give up (waive) any right of privacy or confidentiality with respect to these consents.   I have read this agreement and have asked questions about anything I did not understand.    ___________________________________________________________________________  Patient signature - Date/Time  -Tila Levine                                      ___________________________________________________________________________  Witness signature                                                                    ___________________________________________________________________________  Provider signature- Lan Diop MD

## 2021-06-20 NOTE — LETTER
Letter by Lan Diop MD at      Author: Lan Diop MD Service: -- Author Type: --    Filed:  Encounter Date: 1/29/2020 Status: (Other)         [facility] INTERNAL MEDICINE  01/29/20    Patient: Tila Levine  YOB: 1935  Medical Record Number: [id number]  CSN: [id number]                                                                              Non-opioid Controlled Substance Agreement    I understand that my care provider has prescribed a controlled substance to help manage my condition(s). I am taking this medicine to help me function or work. I know this is strong medicine, and that it can cause serious side effects. Controlled substances can be sedating, addicting and may cause a dependency on the drug. They can affect my ability to drive or think, and cause depression. They need to be taken exactly as prescribed. Combining controlled substances with certain medicines or chemicals (such as cocaine, sedatives and tranquilizers, sleeping pills, meth) can be dangerous or even fatal. Also, if I stop controlled substances suddenly, I may have severe withdrawal symptoms.  If not helpful, I may be asked to stop them.    The risks, benefits, and side effects of these medicine(s) were explained to me. I agree that:    1. I will take part in other treatments as advised by my care team. This may be psychiatry or counseling, physical therapy, behavioral therapy, group treatment or a referral to a pain clinic. I will reduce or stop my medicine when my care team tells me to do so.  2. I will take my medicines as prescribed. I will not change the dose or schedule unless my care team tells me to. There will be no refills if I run out early.  I may be contactedwithout warning and asked to complete a urine drug test or pill count at any time.   3. I will keep all my appointments, and understand this is part of the monitoring of controlled substances. My care team may require an office visit for EVERY  controlled substance refill. If I miss appointments or dont follow instructions, my care team may stop my medicine.  4. I will not ask other providers to prescribe controlled substances, and I will not accept controlled substances from other people. If I need another prescribed controlled substance for a new reason, I will tell my care team within 1 business day.  5. I will use one pharmacy to fill all of my controlled substance prescriptions, and it is up to me to make sure that I do not run out of my medicines on weekends or holidays. If my care team is willing to refill my controlled substance prescription without a visit, I must request refills only during office hours, refills may take up to 3 days to process, and it may take up to 5 to 7 days for my medicine to be mailed and ready at my pharmacy. Prescriptions will not be mailed anywhere except my pharmacy.    6. I am responsible for my prescriptions. If the medicine/prescription is lost or stolen, it will not be replaced. I also agree not to share controlled substance medicines with anyone.          [facility] INTERNAL MEDICINE  01/29/20  Patient:  Tila Levine  YOB: 1935  Medical Record Number: [id number]  CSN: [id number]    7. I agree to not use ANY illegal or recreational drugs. This includes marijuana, cocaine, bath salts or other drugs. I agree not to use alcohol unless my care team says I may. I agree to give urine samples whenever asked. If I dont give a urine sample, the care team may stop my medicine.    8. If I enroll in the [place] Medical Marijuana program, I will tell my care team. I will also sign an agreement to share my medical records with my care team.    9. I will bring in my list of medicines (or my medicine bottles) each time I come to the clinic.   10. I will tell my care team right away if I become pregnant or have a new medical problem treated outside of my regular clinic.  11. I understand that this medicine can affect my  thinking and judgment. It may be unsafe for me to drive, use machinery and do dangerous tasks. I will not do any of these things until I know how the medicine affects me. If my dose changes, I will wait to see how it affects me. I will contact my care team if I have concerns about medicine side effects.    I understand that if I do not follow any of the conditions above, my prescriptions or treatment may be stopped.      I agree that my provider, clinic care team, and pharmacy may work with any city, state or federal law enforcement agency that investigates the misuse, sale, or other diversion of my controlled medicine. I will allow my provider to discuss my care with or share a copy of this agreement with any other treating provider, pharmacy or emergency room where I receive care. I agree to give up (waive) any right of privacy or confidentiality with respect to these consents.   I have read this agreement and have asked questions about anything I did not understand.    ___________________________________________________________________________  Patient signature - Date/Time  -Tila Levine                                      ___________________________________________________________________________  Witness signature                                                                    ___________________________________________________________________________  Provider signature- Lan Diop MD

## 2021-06-21 NOTE — PROGRESS NOTES
Tila Levine  1935      Assessment and Plan:  1. Anxiety/panic episodes- prn lorazepam; CSA signed  2 HTN- stable  3. Hx CABG - stable   4. Reluctant patient - agrees to recheck 6 months       Chief Complaint: Follow-up    Visit diagnoses:    1. Anxiety     2. H/O coronary artery bypass surgery     3. Hypertension     4. Other hyperlipidemia     5. Controlled substance agreement signed       Patient Active Problem List   Diagnosis     Vitamin D Deficiency     Hyperglycemia     Polycythemia Vera     Other hyperlipidemia     Hypertension     Anxiety     Major depression in full remission (H)     Insomnia     H/O coronary artery bypass surgery     Controlled substance agreement signed     Past Medical History:   Diagnosis Date     Anxiety      Depression      Hypertension      Polycythemia     remote hx     Skin cancer      Uncomplicated bereavement 2012    Truman MALCOLM/psychiatrist; end stage dementia     Past Surgical History:   Procedure Laterality Date     CORONARY ARTERY BYPASS GRAFT  2010    LAD     open heart  2012     ID CABG, VEIN, SINGLE      Description: CABG (CABG);  Proc Date: 05/19/2010;     ID REMOVE TONSILS/ADENOIDS,<13 Y/O      Description: Tonsillectomy With Adenoidectomy;  Recorded: 03/11/2008;     Social History     Socioeconomic History     Marital status:      Spouse name: Truman MALCOLM     Number of children: 5     Years of education: Not on file     Highest education level: Not on file   Social Needs     Financial resource strain: Not on file     Food insecurity - worry: Not on file     Food insecurity - inability: Not on file     Transportation needs - medical: Not on file     Transportation needs - non-medical: Not on file   Occupational History     Occupation: RN     Employer: RETIRED     Comment: [employer]   Tobacco Use     Smoking status: Never Smoker     Smokeless tobacco: Never Used   Substance and Sexual Activity     Alcohol use: No     Drug use: No     Sexual activity: Not  on file   Other Topics Concern     Not on file   Social History Narrative     2012 (Truman- dementia/etohism) Grew up in [facility]... 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 3 sisters/dad  surgeon. Met  Truman MALCOLM when he was in Med School in [place] and emigrated to [place] where Truman started psychiatric practice.  Several grown kids including Ronnell, suad Wolfe- psych in [place]. Non smoker/non drinker. 5 grown kids/      Family History   Problem Relation Age of Onset     Congenital heart disease Mother      Congenital heart disease Father      No Medical Problems Son      No Medical Problems Sister      No Medical Problems Sister      No Medical Problems Son      No Medical Problems Son      No Medical Problems Daughter        Meds:  Current Outpatient Medications   Medication Sig Dispense Refill     aspirin 81 MG EC tablet Take 81 mg by mouth daily.       CALCIUM-MAGNESIUM-ZINC ORAL Take by mouth.       cholecalciferol, vitamin D3, (VITAMIN D3) 4,000 unit cap Take 1 tablet by mouth daily.       cyanocobalamin, vitamin B-12, 2,500 mcg Tab Take by mouth.       hydroCHLOROthiazide (HYDRODIURIL) 25 MG tablet Take 1 tablet (25 mg total) by mouth daily. 90 tablet 0     LORazepam (ATIVAN) 1 MG tablet TAKE 1 TABLET (1 MG TOTAL) BY MOUTH DAILY AS NEEDED FOR ANXIETY. 90 tablet 0     melatonin 5 mg Tab Take 1 tablet by mouth daily.       metoprolol succinate (TOPROL-XL) 50 MG 24 hr tablet Take 1 tablet (50 mg total) by mouth daily. 90 tablet 3     nitroglycerin (NITROSTAT) 0.3 MG SL tablet Place 1 tablet (0.3 mg total) under the tongue every 5 (five) minutes as needed for chest pain. 25 tablet 6     OMEGA-3/DHA/EPA/FISH OIL (FISH OIL-OMEGA-3 FATTY ACIDS) 300-1,000 mg capsule Take 2 g by mouth daily.       sertraline (ZOLOFT) 100 MG tablet Take 1 tablet (100 mg total) by mouth daily. 90 tablet 2     simvastatin (ZOCOR)  "20 MG tablet Take 1 tablet (20 mg total) by mouth every evening. 90 tablet 4     No current facility-administered medications for this visit.        No Known Allergies    ROS: complete review of symptoms otherwise negative except as noted below    S: She was called to come in regarding refill and blood pressure check she is always been a reluctant patient she does benefit from lorazepam.  She is otherwise not have any issues or concerns she is feeling very healthy she got a bit teary-eyed when talking about her granddaughter in [place] who is considering a transgender change.  Otherwise no issues or concerns       O:   Vitals:    11/14/18 1028   BP: 134/86   Patient Site: Left Arm   Patient Position: Sitting   Cuff Size: Adult Regular   Pulse: 78   SpO2: 99%   Weight: 183 lb 12.8 oz (83.4 kg)   Height: 5' 5.5\" (1.664 m)       Physical Exam:  General-very pleasant. Witty. [place] woman  VS- see above  HEENT- neg   Neck- no adenopathy/thyromegaly/bruits  Chest- clear   Cor- reg no murmurs/gallops/ectopics  Extremities: no edema, good distal pulses  Neuro- Cr. NN-  intact, alert,    Lab Results   Component Value Date    WBC 6.1 05/14/2018    HGB 14.5 05/14/2018    HCT 41.6 05/14/2018    MCV 89 05/14/2018     05/14/2018     Results for orders placed or performed in visit on 05/14/18   Comprehensive Metabolic Panel   Result Value Ref Range    Sodium 132 (L) 136 - 145 mmol/L    Potassium 4.1 3.5 - 5.0 mmol/L    Chloride 96 (L) 98 - 107 mmol/L    CO2 26 22 - 31 mmol/L    Anion Gap, Calculation 10 5 - 18 mmol/L    Glucose 85 70 - 125 mg/dL    BUN 7 (L) 8 - 28 mg/dL    Creatinine 0.75 0.60 - 1.10 mg/dL    GFR MDRD Af Amer >60 >60 mL/min/1.73m2    GFR MDRD Non Af Amer >60 >60 mL/min/1.73m2    Bilirubin, Total 0.5 0.0 - 1.0 mg/dL    Calcium 9.4 8.5 - 10.5 mg/dL    Protein, Total 6.5 6.0 - 8.0 g/dL    Albumin 3.7 3.5 - 5.0 g/dL    Alkaline Phosphatase 47 45 - 120 U/L    AST 19 0 - 40 U/L    ALT 12 0 - 45 U/L "          Lab Results   Component Value Date    CHOL 158 05/14/2018    CHOL 184 02/15/2017    CHOL 175 08/24/2016     Lab Results   Component Value Date    HDL 55 05/14/2018    HDL 56 02/15/2017    HDL 61 08/24/2016     Lab Results   Component Value Date    LDLCALC 86 05/14/2018    LDLCALC 99 02/15/2017    LDLCALC 91 08/24/2016     Lab Results   Component Value Date    TRIG 85 05/14/2018    TRIG 143 02/15/2017    TRIG 115 08/24/2016     No components found for: CHOLHDL    No results found for this or any previous visit (from the past 240 hour(s)).      Lan Mares MD

## 2021-06-23 NOTE — TELEPHONE ENCOUNTER
Spoke with patient inquiring if she ever got her flu shot this year.  Patient gave me a date and I updated her records.

## 2021-06-23 NOTE — TELEPHONE ENCOUNTER
Who is calling:  patient  Reason for Call:   Patient would [facility] [place] Patient states it has not gotten there yet  Please expedite  Date of last appointment with primary care:   1/10/2019    Has the patient been recently seen:  Yes  Okay to leave a detailed message: Yes

## 2021-06-23 NOTE — TELEPHONE ENCOUNTER
Tyra  [phone]     [facility] - [address], [place] [address]     Call from [place]      Org fill for C-IV  sent to a pharmacy that does not have in stock for pt when she went to pick it up today - they would have to now have to order it.      Appears to have been electronically sent / received on 1/10/2019 to the correct store but reportedly not attempted to be filled before today (she is unsure though)       As it is the original fill, she unable to [facility]     Would like new Rx sent to them at the [facility] store and Discontinued at the other store      This issue would have to be addressed by the care team on Monday.     Pharmacy staff to inform patient.            Reason for Disposition    Nursing judgment or information in reference    Protocols used: NO GUIDELINE [id number]

## 2021-06-23 NOTE — PROGRESS NOTES
ASSESSMENT AND PLAN:    1. Medication monitoring encounter  She signed CSA.  Continues to use lorazepam for anxiety, sparingly without escalation.  I explained our monitoring process.    - LORazepam (ATIVAN) 1 MG tablet; TAKE 1 TABLET (1 MG TOTAL) BY MOUTH DAILY AS NEEDED FOR ANXIETY.  Dispense: 90 tablet; Refill: 0  - Drugs of Abuse 1,Urine    2. Hyperlipidemia  Ongoing treatment    3. 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 Hypertension  Controlled with current treatment.     5. Depression  She is doing well on sertraline 100mg daily and is satisfied with her control.      6. Anxiety  See above, episodic mild panic, managed with lorazepam.      CHIEF COMPLAINT:  Chief Complaint   Patient presents with     Establish Care     HISTORY OF PRESENT ILLNESS:  Tila Levine is a 83 y.o. female is here to establish care.  She feels satisfied with current treatments.  She lives with her son who has schizophrenia and is doing well.  (His early illness, she feels, is what caused her anxiety and depression).  She also now has her recently  daughter living with her.  She enjoys the social contact.  No unusual dyspnea or nausea or fever, or unusual cough.     REVIEW OF SYSTEMS:   See HPI, all other  systems on review are negative.    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) 12/15/2014     Insomnia 12/15/2014     H/O coronary artery bypass surgery 05/19/2010     Controlled substance agreement signed 11/14/2018     History of sarcoidosis 01/10/2019     Resolved Ambulatory Problems     Diagnosis Date Noted     Depression      Past Medical History:   Diagnosis Date     Anxiety      Depression      Hypertension      Polycythemia      Skin cancer      Uncomplicated bereavement 2012     Past Surgical  "History:   Procedure Laterality Date     CORONARY ARTERY BYPASS GRAFT  2010    LAD     TONSILLECTOMY      Description: Tonsillectomy With Adenoidectomy;  Recorded: 03/11/2008;     VITALS:  Vitals:    01/10/19 0941   BP: 138/80   Patient Site: Left Arm   Patient Position: Sitting   Cuff Size: Adult Regular   Pulse: 80   SpO2: 100%   Weight: 183 lb (83 kg)   Height: 5' 5.5\" (1.664 m)     Wt Readings from Last 3 Encounters:   01/10/19 183 lb (83 kg)   11/14/18 183 lb 12.8 oz (83.4 kg)   05/14/18 174 lb 4 oz (79 kg)     PHYSICAL EXAM:  Constitutional:  Well appearing in NAD, alert and oriented  Cardiac:  S1 S2 without murmur, rhythm regular  Lungs: Clear to auscultation, without wheezes or rales    Current Outpatient Medications   Medication Sig Dispense Refill     aspirin 81 MG EC tablet Take 81 mg by mouth daily.       CALCIUM-MAGNESIUM-ZINC ORAL Take by mouth.       cholecalciferol, vitamin D3, (VITAMIN D3) 4,000 unit cap Take 1 tablet by mouth daily.       cyanocobalamin, vitamin B-12, 2,500 mcg Tab Take by mouth.       hydroCHLOROthiazide (HYDRODIURIL) 25 MG tablet Take 1 tablet (25 mg total) by mouth daily. 90 tablet 0     LORazepam (ATIVAN) 1 MG tablet TAKE 1 TABLET (1 MG TOTAL) BY MOUTH DAILY AS NEEDED FOR ANXIETY. 90 tablet 0     melatonin 5 mg Tab Take 1 tablet by mouth daily.       metoprolol succinate (TOPROL-XL) 50 MG 24 hr tablet Take 1 tablet (50 mg total) by mouth daily. 90 tablet 3     OMEGA-3/DHA/EPA/FISH OIL (FISH OIL-OMEGA-3 FATTY ACIDS) 300-1,000 mg capsule Take 2 g by mouth daily.       sertraline (ZOLOFT) 100 MG tablet Take 1 tablet (100 mg total) by mouth daily. 90 tablet 2     simvastatin (ZOCOR) 20 MG tablet Take 1 tablet (20 mg total) by mouth every evening. 90 tablet 4     No current facility-administered medications for this visit.      Lan Diop MD  Internal Medicine  [facility]  "

## 2021-06-25 NOTE — TELEPHONE ENCOUNTER
Refill Approved    Rx renewed per Medication Renewal Policy. Medication was last renewed on 03/17/2020.  Last office visit was 10/19/2020 with PCP.    Teri Rushing, Care Connection Triage/Med Refill 6/3/2021     Requested Prescriptions   Pending Prescriptions Disp Refills     sertraline (ZOLOFT) 100 MG tablet [Pharmacy Med Name: SERTRALINE  MG TABLET] 90 tablet 3     Sig: TAKE 1 TABLET BY MOUTH EVERY DAY       SSRI Refill Protocol  Passed - 6/3/2021 12:18 AM        Passed - PCP or prescribing provider visit in last year     Last office visit with prescriber/PCP: 1/29/2020 Lan Diop MD OR same dept: Visit date not found OR same specialty: 1/29/2020 Lan Diop MD  Last physical: Visit date not found Last MTM visit: Visit date not found   Next visit within 3 mo: Visit date not found  Next physical within 3 mo: Visit date not found  Prescriber OR PCP: Lan Diop MD  Last diagnosis associated with med order: 1. Major depression in full remission (H)  - sertraline (ZOLOFT) 100 MG tablet [Pharmacy Med Name: SERTRALINE  MG TABLET]; TAKE 1 TABLET BY MOUTH EVERY DAY  Dispense: 90 tablet; Refill: 3    If protocol passes may refill for 12 months if within 3 months of last provider visit (or a total of 15 months).

## 2021-07-03 NOTE — ADDENDUM NOTE
Addendum Note by Sola Sr LPN at 1/21/2019  8:56 AM     Author: Sola rS LPN Service: -- Author Type: Licensed Nurse    Filed: 1/21/2019  8:56 AM Encounter Date: 1/19/2019 Status: Signed    : Sola Sr LPN (Licensed Nurse)    Addended by: SOLA SR on: 1/21/2019 08:56 AM        Modules accepted: Orders

## 2021-07-03 NOTE — ADDENDUM NOTE
Addendum Note by Eulalio Simon MD at 1/23/2019  4:38 PM     Author: Eulalio Simon MD Service: -- Author Type: Physician    Filed: 1/23/2019  4:38 PM Encounter Date: 1/19/2019 Status: Signed    : Eulalio Simon MD (Physician)    Addended by: EULALIO SIMON on: 1/23/2019 04:38 PM        Modules accepted: Orders

## 2021-07-05 ENCOUNTER — COMMUNICATION - HEALTHEAST (OUTPATIENT)
Dept: INTERNAL MEDICINE | Facility: CLINIC | Age: 86
End: 2021-07-05

## 2021-07-05 DIAGNOSIS — F41.9 ANXIETY: ICD-10-CM

## 2021-07-05 NOTE — TELEPHONE ENCOUNTER
Telephone Encounter by Amy Mantilla LPN at 7/5/2021  3:58 PM     Author: Amy Mantilla LPN Service: -- Author Type: Licensed Nurse    Filed: 7/5/2021  3:59 PM Encounter Date: 7/5/2021 Status: Signed    : Amy Mantilla LPN (Licensed Nurse)       Medication: Lorazepam  Last Date Filled 4/12/21   pulled: YES         Only PCP Prescribing? : YES  Taken as prescribed from physician notes? NO    CSA in last year: NO  Random Utox in last year: NO  (if any of the above answer NO - schedule with PCP)     Opioids + benzodiazepines? NO  (if the above answer YES - schedule with PCP every 6 months)     Is patient on the Executive Review Team? no      All responses suggest: Refilling prescription

## 2021-07-05 NOTE — TELEPHONE ENCOUNTER
Telephone Encounter by Marta Jasmine RN at 7/5/2021 11:02 AM     Author: Marta Jasmine RN Service: -- Author Type: Registered Nurse    Filed: 7/5/2021 11:02 AM Encounter Date: 7/5/2021 Status: Signed    : Marta Jasmine RN (Registered Nurse)       Controlled Substance Refill Request  Medication Name:   Requested Prescriptions     Pending Prescriptions Disp Refills   ? LORazepam (ATIVAN) 1 MG tablet [Pharmacy Med Name: LORAZEPAM 1 MG TABLET] 90 tablet 0     Sig: TAKE 1 TABLET BY MOUTH EVERY DAY AS NEEDED FOR ANXIETY     Date Last Fill: 4/8/21  [facility]  [facility] to [facility]  Controlled Substance Agreement on file:   Encounter-Level CSA Scan Date - 11/14/2018:    Scan on 11/19/2018  2:34 PM           Encounter-Level CSA Scan Date - 08/09/2017:    Scan on 8/11/2017  1:46 PM           Encounter-Level CSA Scan Date - 08/24/2016:    Scan on 8/30/2016  7:39 AM        Last office visit:  10/19/20         Marta Jasmine RN, BSN Nurse Triage Advisor 11:02 AM 7/5/2021

## 2021-07-06 RX ORDER — LORAZEPAM 1 MG/1
TABLET ORAL
Qty: 90 TABLET | Refills: 0 | Status: SHIPPED | OUTPATIENT
Start: 2021-07-06 | End: 2021-10-06

## 2021-07-07 ENCOUNTER — COMMUNICATION - HEALTHEAST (OUTPATIENT)
Dept: INTERNAL MEDICINE | Facility: CLINIC | Age: 86
End: 2021-07-07

## 2021-07-07 DIAGNOSIS — I10 HTN (HYPERTENSION): ICD-10-CM

## 2021-07-07 NOTE — TELEPHONE ENCOUNTER
Telephone Encounter by Dee Dee Rice RN at 7/7/2021  8:00 AM     Author: Dee Dee Rice RN Service: -- Author Type: Registered Nurse    Filed: 7/7/2021  8:00 AM Encounter Date: 7/7/2021 Status: Signed    : Dee Dee Rice RN (Registered Nurse)       RN cannot approve Refill Request    RN can NOT refill this medication PCP messaged that patient is overdue for BP check. Last office visit: 1/29/2020 Lan Diop MD Last Physical: Visit date not found Last MTM visit: Visit date not found Last visit same specialty: 1/29/2020 Lan Diop MD.  Next visit within 3 mo: Visit date not found  Next physical within 3 mo: Visit date not found      Alexandra Aleman Connection Triage/Med Refill 7/7/2021    Requested Prescriptions   Pending Prescriptions Disp Refills   ? metoprolol succinate (TOPROL-XL) 50 MG 24 hr tablet [Pharmacy Med Name: METOPROLOL SUCC ER 50 MG TAB] 90 tablet 3     Sig: TAKE 1 TABLET BY MOUTH EVERY DAY       Beta-Blockers Refill Protocol Failed - 7/7/2021 12:22 AM        Failed - Blood pressure filed in past 12 months     BP Readings from Last 1 Encounters:   01/29/20 120/84             Passed - PCP or prescribing provider visit in past 12 months or next 3 months     Last office visit with prescriber/PCP: 1/29/2020 Lan Diop MD OR same dept: Visit date not found OR same specialty: 1/29/2020 Lan Diop MD  Last physical: Visit date not found Last MTM visit: Visit date not found   Next visit within 3 mo: Visit date not found  Next physical within 3 mo: Visit date not found  Prescriber OR PCP: Lan Diop MD  Last diagnosis associated with med order: 1. HTN (hypertension)  - metoprolol succinate (TOPROL-XL) 50 MG 24 hr tablet [Pharmacy Med Name: METOPROLOL SUCC ER 50 MG TAB]; TAKE 1 TABLET BY MOUTH EVERY DAY  Dispense: 90 tablet; Refill: 3    If protocol passes may refill for 12 months if within 3 months of last provider visit (or a total of 15 months).

## 2021-07-09 ENCOUNTER — COMMUNICATION - HEALTHEAST (OUTPATIENT)
Dept: FAMILY MEDICINE | Facility: CLINIC | Age: 86
End: 2021-07-09

## 2021-07-09 RX ORDER — METOPROLOL SUCCINATE 50 MG/1
TABLET, EXTENDED RELEASE ORAL
Qty: 90 TABLET | Refills: 3 | Status: SHIPPED | OUTPATIENT
Start: 2021-07-09 | End: 2022-06-09

## 2021-07-09 NOTE — TELEPHONE ENCOUNTER
Telephone Encounter by Luz Marina Hinojosa CMA at 7/9/2021  8:09 AM     Author: Luz Marina Hionjosa CMA Service: -- Author Type: Certified Medical Assistant    Filed: 7/9/2021  8:09 AM Encounter Date: 7/9/2021 Status: Signed    : Luz Marina Hinojosa CMA (Certified Medical Assistant)       Open in error  Luz Marina Hinojosa CMA (AAMA)

## 2021-10-04 ENCOUNTER — TELEPHONE (OUTPATIENT)
Dept: INTERNAL MEDICINE | Facility: CLINIC | Age: 86
End: 2021-10-04

## 2021-10-04 DIAGNOSIS — F41.1 GENERALIZED ANXIETY DISORDER: Primary | ICD-10-CM

## 2021-10-04 DIAGNOSIS — F32.9 REACTIVE DEPRESSION: ICD-10-CM

## 2021-10-04 DIAGNOSIS — F41.9 ANXIETY: ICD-10-CM

## 2021-10-04 DIAGNOSIS — F32.5 MAJOR DEPRESSION IN FULL REMISSION (H): ICD-10-CM

## 2021-10-04 NOTE — TELEPHONE ENCOUNTER
Reason for Call:  Medication or medication refill:    Do you use a [facility]?  Name of the pharmacy and phone [facility] for the [facility] [address]    Name of the medication requested:   Lorazepam  Sertraline    Other request:   n/a    Can we leave a detailed message on this number? YES    Phone number patient can be reached at: Home number on file [phone] (home)    Best Time: anytime    Call taken on 10/4/2021 at 8:20 AM by Salima Cornejo

## 2021-10-05 NOTE — TELEPHONE ENCOUNTER
Medication: lorazepam & sertraline   Last Date Filled 7/6/21 & 6/3/21  Last appointment addressing medication use: 10/16/20      Taken as prescribed from physician notes? YES    CSA in last year: NO    Random Utox in last year: NO  (if any of the above answer NO - schedule with PCP)     Opioids + benzodiazepines? NO  (if the above answer YES - schedule with PCP every 6 months)       All responses suggest: Scheduling with PCP for further intervention

## 2021-10-06 RX ORDER — LORAZEPAM 1 MG/1
TABLET ORAL
Qty: 90 TABLET | Refills: 0 | Status: SHIPPED | OUTPATIENT
Start: 2021-10-06 | End: 2022-07-28

## 2021-10-06 RX ORDER — SERTRALINE HYDROCHLORIDE 100 MG/1
100 TABLET, FILM COATED ORAL DAILY
Qty: 90 TABLET | Refills: 3 | Status: SHIPPED | OUTPATIENT
Start: 2021-10-06 | End: 2022-10-29

## 2022-02-09 ENCOUNTER — TELEPHONE (OUTPATIENT)
Dept: INTERNAL MEDICINE | Facility: CLINIC | Age: 87
End: 2022-02-09
Payer: MEDICARE

## 2022-02-09 NOTE — TELEPHONE ENCOUNTER
Contacted patient. Reassured patient that if she calls to request refills she will get her refills as needed. Patient states she has not been going out due to her age and does not want to have to come to the clinic unless she needs to see the Dr. Because she is having a health concern.

## 2022-02-09 NOTE — TELEPHONE ENCOUNTER
Reason for Call:  Other call back    Detailed comments: Pt stating she is concerned about getting her medications when needed    Please advise    Phone Number Patient can be reached at: Home number on file [phone] (home)    Best Time: anytime    Can we leave a detailed message on this number? YES    Call taken on 2/9/2022 at 1:39 PM by Salima Cornejo

## 2022-03-09 DIAGNOSIS — E78.5 HYPERLIPIDEMIA: ICD-10-CM

## 2022-03-09 DIAGNOSIS — I10 HTN (HYPERTENSION): ICD-10-CM

## 2022-03-10 RX ORDER — HYDROCHLOROTHIAZIDE 25 MG/1
TABLET ORAL
Qty: 90 TABLET | Refills: 3 | Status: SHIPPED | OUTPATIENT
Start: 2022-03-10 | End: 2023-04-06

## 2022-03-10 RX ORDER — SIMVASTATIN 20 MG
TABLET ORAL
Qty: 90 TABLET | Refills: 3 | Status: SHIPPED | OUTPATIENT
Start: 2022-03-10 | End: 2023-03-14

## 2022-03-10 NOTE — TELEPHONE ENCOUNTER
"Routing refill request to provider for review/approval because:  Labs not current:  Multiple  Patient needs to be seen because it has been more than 1 year since last office visit.  BP warning  Early fill    hydrochlorothiazide  Last Written Prescription Date:  4/12/21  Last Fill Quantity: 90,  # refills: 3   Simvastatin:   Last Written Prescription Date:  12/17/2020  Last Fill Quantity: 90,  # refills: 3   Last office visit provider:  10/19/2020     Requested Prescriptions   Pending Prescriptions Disp Refills     simvastatin (ZOCOR) 20 MG tablet 90 tablet 3       Statins Protocol Failed - 3/9/2022 10:26 AM        Failed - LDL on file in past 12 months     Recent Labs   Lab Test 05/14/18  1120   LDL 86             Failed - Recent (12 mo) or future (30 days) visit within the authorizing provider's specialty     Patient has had an office visit with the authorizing provider or a provider within the authorizing providers department within the previous 12 mos or has a future within next 30 days. See \"Patient Info\" tab in inbasket, or \"Choose Columns\" in Meds & Orders section of the refill encounter.              Passed - No abnormal creatine kinase in past 12 months     No lab results found.             Passed - Medication is active on med list        Passed - Patient is age 18 or older        Passed - No active pregnancy on record        Passed - No positive pregnancy test in past 12 months           hydrochlorothiazide (HYDRODIURIL) 25 MG tablet 90 tablet 3       Diuretics (Including Combos) Protocol Failed - 3/9/2022 10:26 AM        Failed - Blood pressure under 140/90 in past 12 months     BP Readings from Last 3 Encounters:   01/29/20 120/84                 Failed - Recent (12 mo) or future (30 days) visit within the authorizing provider's specialty     Patient has had an office visit with the authorizing provider or a provider within the authorizing providers department within the previous 12 mos or has a future " "within next 30 days. See \"Patient Info\" tab in inbasket, or \"Choose Columns\" in Meds & Orders section of the refill encounter.              Failed - Normal serum creatinine on file in past 12 months     Recent Labs   Lab Test 01/29/20  1139   CR 0.73              Failed - Normal serum potassium on file in past 12 months     Recent Labs   Lab Test 01/29/20  1139   POTASSIUM 4.3                    Failed - Normal serum sodium on file in past 12 months     Recent Labs   Lab Test 01/29/20  1139                 Passed - Medication is active on med list        Passed - Patient is age 18 or older        Passed - No active pregancy on record        Passed - No positive pregnancy test in past 12 months             Candi Lorenzo RN 03/10/22 7:34 AM  "

## 2022-06-09 DIAGNOSIS — I10 HTN (HYPERTENSION): ICD-10-CM

## 2022-06-09 NOTE — TELEPHONE ENCOUNTER
Refill Request  Medication name: Pending Prescriptions:                       Disp   Refills    metoprolol succinate ER (TOPROL XL) 50 MG*90 tab*3          Who prescribed the medication: PCp  Last refill on medication: 7/9/21  [facility]  Last appointment with PCP: 10/19/2020  Next appointment: Patient due for appointment

## 2022-06-10 NOTE — TELEPHONE ENCOUNTER
Two month ever period refill pended for provider review. Annual wellness visit scheduled w/ Dr. Diop 7/28/22

## 2022-06-10 NOTE — TELEPHONE ENCOUNTER
"Routing refill request to provider for review/approval because:  Patient needs to be seen because it has been more than 1 year since last office visit.  BP not current  Early refill requested.    Last Written Prescription Date:  7/9/21  Last Fill Quantity: 90,  # refills: 3   Last office visit provider:  10/19/20     Requested Prescriptions   Pending Prescriptions Disp Refills     metoprolol succinate ER (TOPROL XL) 50 MG 24 hr tablet 90 tablet 3       Beta-Blockers Protocol Failed - 6/10/2022  8:31 AM        Failed - Blood pressure under 140/90 in past 12 months     BP Readings from Last 3 Encounters:   01/29/20 120/84                 Failed - Recent (12 mo) or future (30 days) visit within the authorizing provider's specialty     Patient has had an office visit with the authorizing provider or a provider within the authorizing providers department within the previous 12 mos or has a future within next 30 days. See \"Patient Info\" tab in inbasket, or \"Choose Columns\" in Meds & Orders section of the refill encounter.              Passed - Patient is age 6 or older        Passed - Medication is active on med list             Jorgito Montalvo RN 06/10/22 8:31 AM  "

## 2022-06-10 NOTE — TELEPHONE ENCOUNTER
06/10 DONNA rivers and scheduled her for a AWV next available was July 28th at 1:10 pm arrival time

## 2022-06-13 DIAGNOSIS — I10 HTN (HYPERTENSION): ICD-10-CM

## 2022-06-13 RX ORDER — METOPROLOL SUCCINATE 50 MG/1
TABLET, EXTENDED RELEASE ORAL
Qty: 60 TABLET | Refills: 0 | Status: SHIPPED | OUTPATIENT
Start: 2022-06-13 | End: 2022-06-13

## 2022-06-13 RX ORDER — METOPROLOL SUCCINATE 50 MG/1
TABLET, EXTENDED RELEASE ORAL
Qty: 90 TABLET | Refills: 3 | Status: SHIPPED | OUTPATIENT
Start: 2022-06-13

## 2022-06-13 NOTE — TELEPHONE ENCOUNTER
Patient calling to say pharmacy is requesting a new prescription for metoprolol. Please refill to :       [facility]/pharmacy #[id number] - [place] - [address]  [phone]/pharmacy #[id number] - [place] - [address]  [phone]

## 2022-07-28 ENCOUNTER — OFFICE VISIT (OUTPATIENT)
Dept: INTERNAL MEDICINE | Facility: CLINIC | Age: 87
End: 2022-07-28
Payer: MEDICARE

## 2022-07-28 VITALS
OXYGEN SATURATION: 98 % | BODY MASS INDEX: 29.09 KG/M2 | WEIGHT: 181 LBS | SYSTOLIC BLOOD PRESSURE: 121 MMHG | RESPIRATION RATE: 16 BRPM | HEART RATE: 72 BPM | DIASTOLIC BLOOD PRESSURE: 82 MMHG | TEMPERATURE: 98.1 F | HEIGHT: 66 IN

## 2022-07-28 DIAGNOSIS — E78.5 HYPERLIPIDEMIA LDL GOAL <100: ICD-10-CM

## 2022-07-28 DIAGNOSIS — I10 ESSENTIAL HYPERTENSION: ICD-10-CM

## 2022-07-28 DIAGNOSIS — Z95.1 H/O CORONARY ARTERY BYPASS SURGERY: Primary | Chronic | ICD-10-CM

## 2022-07-28 PROBLEM — Z79.899 CONTROLLED SUBSTANCE AGREEMENT SIGNED: Chronic | Status: RESOLVED | Noted: 2018-11-14 | Resolved: 2022-07-28

## 2022-07-28 PROCEDURE — G0439 PPPS, SUBSEQ VISIT: HCPCS | Performed by: INTERNAL MEDICINE

## 2022-07-28 ASSESSMENT — ENCOUNTER SYMPTOMS
EYE PAIN: 0
FEVER: 0
MYALGIAS: 0
WEAKNESS: 0
BREAST MASS: 0
SHORTNESS OF BREATH: 0
PALPITATIONS: 0
PARESTHESIAS: 0
ARTHRALGIAS: 0
NERVOUS/ANXIOUS: 0
HEMATURIA: 0
HEADACHES: 0
JOINT SWELLING: 0
CONSTIPATION: 0
COUGH: 0
NAUSEA: 0
ABDOMINAL PAIN: 0
HEMATOCHEZIA: 0
CHILLS: 0
FREQUENCY: 0
HEARTBURN: 0
SORE THROAT: 0
DIZZINESS: 0
DIARRHEA: 0
DYSURIA: 0

## 2022-07-28 ASSESSMENT — PATIENT HEALTH QUESTIONNAIRE - PHQ9
SUM OF ALL RESPONSES TO PHQ QUESTIONS 1-9: 0
10. IF YOU CHECKED OFF ANY PROBLEMS, HOW DIFFICULT HAVE THESE PROBLEMS MADE IT FOR YOU TO DO YOUR WORK, TAKE CARE OF THINGS AT HOME, OR GET ALONG WITH OTHER PEOPLE: NOT DIFFICULT AT ALL
SUM OF ALL RESPONSES TO PHQ QUESTIONS 1-9: 0

## 2022-07-28 ASSESSMENT — ACTIVITIES OF DAILY LIVING (ADL): CURRENT_FUNCTION: NO ASSISTANCE NEEDED

## 2022-07-28 NOTE — PROGRESS NOTES
"SUBJECTIVE:   Tila Levine is a 86 year old female who presents for Preventive Visit.    HPI:  Doing well.  No Covid infection.  Her daughter lives with her.  No unusual dyspnea or cough, or chest pain.  Has stopped lorazepam and ambien.  Feels better off them.  Sleeps OK.  No specific issues of concern at this time.     Patient has been advised of split billing requirements and indicates understanding: Yes  Are you in the first 12 months of your Medicare coverage?  No    Healthy Habits:     In general, how would you rate your overall health?  Excellent    Frequency of exercise:  1 day/week    Duration of exercise:  Less than 15 minutes    Do you usually eat at least 4 servings of fruit and vegetables a day, include whole grains    & fiber and avoid regularly eating high fat or \"junk\" foods?  Yes    Taking medications regularly:  Yes    Medication side effects:  None    Ability to successfully perform activities of daily living:  No assistance needed    Home Safety:  No safety concerns identified    Hearing Impairment:  Need to ask people to speak up or repeat themselves and no hearing concerns    In the past 6 months, have you been bothered by leaking of urine?  No    In general, how would you rate your overall mental or emotional health?  Excellent      PHQ-2 Total Score: 0    Additional concerns today:  No    Do you feel safe in your environment? Yes    Have you ever done Advance Care Planning? (For example, a Health Directive, POLST, or a discussion with a medical provider or your loved ones about your wishes): No, advance care planning information given to patient to review.  Patient declined advance care planning discussion at this time.    Cognitive Screening   1) Repeat 3 items (Leader, Season, Table)    2) Clock draw: NORMAL  3) 3 item recall: Recalls 3 objects  Results: NORMAL clock, 1-2 items recalled: COGNITIVE IMPAIRMENT LESS LIKELY    Mini-CogTM Copyright S Prince. Licensed by the author for use " "in [facility]; reprinted with permission (anjaliliborio@[facility]). All rights reserved.      Do you have sleep apnea, excessive snoring or daytime drowsiness?: no    Reviewed and updated as needed this visit by clinical staff     Meds            Reviewed and updated as needed this visit by Provider     Social History     Tobacco Use     Smoking status: Never Smoker     Smokeless tobacco: Never Used   Substance Use Topics     Alcohol use: No     Alcohol Use 7/28/2022   Prescreen: >3 drinks/day or >7 drinks/week? No     Current providers sharing in care for this patient include:   Patient Care Team:  Lan Diop MD as Assigned PCP    The following health maintenance items are reviewed in Epic and correct as of today:  Health Maintenance Due   Topic Date Due     DEXA  Never done     ANNUAL REVIEW OF  ORDERS  Never done     DEPRESSION ACTION PLAN  Never done     MEDICARE ANNUAL WELLNESS VISIT  Never done     ZOSTER IMMUNIZATION (2 of 3) 05/20/2014     ADVANCE CARE PLANNING  02/10/2021     ROS:  See J{O    OBJECTIVE:     PHYSICAL EXAM:  General Appearance: In no acute distress  /82 (BP Location: Left arm, Patient Position: Sitting, Cuff Size: Adult Regular)   Pulse 72   Temp 98.1  F (36.7  C) (Oral)   Resp 16   Ht 1.676 m (5' 6\")   Wt 82.1 kg (181 lb)   SpO2 98%   BMI 29.21 kg/m    NECK:  without cervical or axillary adenopathy  RESPIRATORY: Clear   CARDIOVASCULAR: S1, S2  ABDOMEN: soft, flat, and non-tender  EXTREMITIES: No joint swelling, mild bilateral edema  NEUROLOGIC: No arm or leg  weakness, speech is clear, gait is normal    ASSESSMENT / PLAN:     Tila was seen today for physical and recheck medication.    Diagnoses and all orders for this visit:    H/O coronary artery bypass surgery  No symptoms.     Essential hypertension  Satisfactory control.     Hyperlipidemia LDL goal <100  Ongoing statin therapy.     Patient has been advised of split billing requirements and indicates " "understanding: Yes    COUNSELING:  Reviewed preventive health counseling, as reflected in patient instructions       Regular exercise       Healthy diet/nutrition    Estimated body mass index is 32.12 kg/m  as calculated from the following:    Height as of 1/29/20: 1.664 m (5' 5.5\").    Weight as of 1/29/20: 88.9 kg (196 lb).    She reports that she has never smoked. She has never used smokeless tobacco.    Appropriate preventive services were discussed with this patient, including applicable screening as appropriate for cardiovascular disease, diabetes, osteopenia/osteoporosis, and glaucoma.  As appropriate for age/gender, discussed screening for colorectal cancer, prostate cancer, breast cancer, and cervical cancer. Checklist reviewing preventive services available has been given to the patient.    Reviewed patients plan of care and provided an AVS. The Basic Care Plan (routine screening as documented in Health Maintenance) for Tila meets the Care Plan requirement. This Care Plan has been established and reviewed with the Patient.    Lan Diop MD  [facility]      "

## 2022-10-28 DIAGNOSIS — F32.9 REACTIVE DEPRESSION: ICD-10-CM

## 2022-10-29 RX ORDER — SERTRALINE HYDROCHLORIDE 100 MG/1
100 TABLET, FILM COATED ORAL DAILY
Qty: 90 TABLET | Refills: 0 | Status: SHIPPED | OUTPATIENT
Start: 2022-10-29 | End: 2023-01-28

## 2022-10-30 NOTE — TELEPHONE ENCOUNTER
"Last Written Prescription Date:  10/6/21  Last Fill Quantity: 90,  # refills: 3   Last office visit provider:  7/28/22     Requested Prescriptions   Pending Prescriptions Disp Refills     sertraline (ZOLOFT) 100 MG tablet 90 tablet 3     Sig: Take 1 tablet (100 mg) by mouth daily       SSRIs Protocol Passed - 10/28/2022 11:34 AM        Passed - PHQ-9 score less than 5 in past 6 months     Please review last PHQ-9 score.           Passed - Medication is active on med list        Passed - Patient is age 18 or older        Passed - No active pregnancy on record        Passed - No positive pregnancy test in last 12 months        Passed - Recent (6 mo) or future (30 days) visit within the authorizing provider's specialty     Patient had office visit in the last 6 months or has a visit in the next 30 days with authorizing provider or within the authorizing provider's specialty.  See \"Patient Info\" tab in inbasket, or \"Choose Columns\" in Meds & Orders section of the refill encounter.                 Lore Carrera RN 10/29/22 11:15 PM  "

## 2023-01-27 DIAGNOSIS — F32.9 REACTIVE DEPRESSION: ICD-10-CM

## 2023-01-28 RX ORDER — SERTRALINE HYDROCHLORIDE 100 MG/1
100 TABLET, FILM COATED ORAL DAILY
Qty: 90 TABLET | Refills: 0 | Status: SHIPPED | OUTPATIENT
Start: 2023-01-28 | End: 2023-05-02

## 2023-01-28 NOTE — TELEPHONE ENCOUNTER
"Routing refill request to provider for review/approval because:  phq 9    Last Written Prescription Date:  10/29/22  Last Fill Quantity: 90,  # refills: 0   Last office visit provider:  7/28/22     Requested Prescriptions   Pending Prescriptions Disp Refills     sertraline (ZOLOFT) 100 MG tablet 90 tablet 0     Sig: Take 1 tablet (100 mg) by mouth daily       SSRIs Protocol Failed - 1/27/2023 10:17 AM        Failed - PHQ-9 score less than 5 in past 6 months     Please review last PHQ-9 score.           Failed - Recent (6 mo) or future (30 days) visit within the authorizing provider's specialty     Patient had office visit in the last 6 months or has a visit in the next 30 days with authorizing provider or within the authorizing provider's specialty.  See \"Patient Info\" tab in inbasket, or \"Choose Columns\" in Meds & Orders section of the refill encounter.            Passed - Medication is active on med list        Passed - Patient is age 18 or older        Passed - No active pregnancy on record        Passed - No positive pregnancy test in last 12 months             Lore Carrera, RN 01/28/23 1:39 PM  "

## 2023-03-13 DIAGNOSIS — E78.5 HYPERLIPIDEMIA: ICD-10-CM

## 2023-03-14 RX ORDER — SIMVASTATIN 20 MG
TABLET ORAL
Qty: 90 TABLET | Refills: 3 | Status: SHIPPED | OUTPATIENT
Start: 2023-03-14 | End: 2024-03-29

## 2023-03-14 NOTE — TELEPHONE ENCOUNTER
"Routing refill request to provider for review/approval because:  Labs not current:  ldl    Last Written Prescription Date:  3/10/22  Last Fill Quantity: 90,  # refills: 3   Last office visit provider:  7/28/22     Requested Prescriptions   Pending Prescriptions Disp Refills     simvastatin (ZOCOR) 20 MG tablet 90 tablet 3     Sig: [SIMVASTATIN (ZOCOR) 20 MG TABLET] TAKE 1 TABLET BY MOUTH EVERY DAY IN THE EVENING       Statins Protocol Failed - 3/13/2023  2:43 PM        Failed - LDL on file in past 12 months     Recent Labs   Lab Test 05/14/18  1120   LDL 86             Passed - No abnormal creatine kinase in past 12 months     No lab results found.             Passed - Recent (12 mo) or future (30 days) visit within the authorizing provider's specialty     Patient has had an office visit with the authorizing provider or a provider within the authorizing providers department within the previous 12 mos or has a future within next 30 days. See \"Patient Info\" tab in inbasket, or \"Choose Columns\" in Meds & Orders section of the refill encounter.              Passed - Medication is active on med list        Passed - Patient is age 18 or older        Passed - No active pregnancy on record        Passed - No positive pregnancy test in past 12 months             Lore Carrera RN 03/14/23 2:08 PM  "

## 2023-04-05 DIAGNOSIS — I10 HTN (HYPERTENSION): ICD-10-CM

## 2023-04-06 RX ORDER — HYDROCHLOROTHIAZIDE 25 MG/1
TABLET ORAL
Qty: 90 TABLET | Refills: 3 | Status: SHIPPED | OUTPATIENT
Start: 2023-04-06

## 2023-04-06 NOTE — TELEPHONE ENCOUNTER
"Routing refill request to provider for review/approval because:  Labs not current    Last Written Prescription Date:  3/10/2022  Last Fill Quantity: 90,  # refills: 3   Last office visit provider:  7/28/2022     Requested Prescriptions   Pending Prescriptions Disp Refills     hydrochlorothiazide (HYDRODIURIL) 25 MG tablet 90 tablet 3     Sig: [HYDROCHLOROTHIAZIDE (HYDRODIURIL) 25 MG TABLET] TAKE 1 TABLET BY MOUTH EVERY DAY       Diuretics (Including Combos) Protocol Failed - 4/5/2023 11:35 AM        Failed - Normal serum creatinine on file in past 12 months     Recent Labs   Lab Test 01/29/20  1139   CR 0.73              Failed - Normal serum potassium on file in past 12 months     Recent Labs   Lab Test 01/29/20  1139   POTASSIUM 4.3                    Failed - Normal serum sodium on file in past 12 months     Recent Labs   Lab Test 01/29/20  1139                 Passed - Blood pressure under 140/90 in past 12 months     BP Readings from Last 3 Encounters:   07/28/22 121/82   01/29/20 120/84                 Passed - Recent (12 mo) or future (30 days) visit within the authorizing provider's specialty     Patient has had an office visit with the authorizing provider or a provider within the authorizing providers department within the previous 12 mos or has a future within next 30 days. See \"Patient Info\" tab in inbasket, or \"Choose Columns\" in Meds & Orders section of the refill encounter.              Passed - Medication is active on med list        Passed - Patient is age 18 or older        Passed - No active pregancy on record        Passed - No positive pregnancy test in past 12 months             Karon Simmons RN 04/06/23 9:29 AM  "

## 2023-05-01 DIAGNOSIS — F32.9 REACTIVE DEPRESSION: ICD-10-CM

## 2023-05-02 RX ORDER — SERTRALINE HYDROCHLORIDE 100 MG/1
100 TABLET, FILM COATED ORAL DAILY
Qty: 90 TABLET | Refills: 0 | Status: SHIPPED | OUTPATIENT
Start: 2023-05-02 | End: 2023-07-31

## 2023-05-02 NOTE — TELEPHONE ENCOUNTER
"Routing refill request to provider for review/approval because:  Patient needs to be seen because it has been more than 6 months since last visit  PHQ-9 score needs updating    Last Written Prescription Date:  1/28/2023  Last Fill Quantity: 90,  # refills: 0   Last office visit provider:  7/28/2022     Requested Prescriptions   Pending Prescriptions Disp Refills     sertraline (ZOLOFT) 100 MG tablet 90 tablet 0     Sig: Take 1 tablet (100 mg) by mouth daily       SSRIs Protocol Failed - 5/2/2023  9:33 AM        Failed - PHQ-9 score less than 5 in past 6 months     Please review last PHQ-9 score.           Failed - Recent (6 mo) or future (30 days) visit within the authorizing provider's specialty     Patient had office visit in the last 6 months or has a visit in the next 30 days with authorizing provider or within the authorizing provider's specialty.  See \"Patient Info\" tab in inbasket, or \"Choose Columns\" in Meds & Orders section of the refill encounter.            Passed - Medication is active on med list        Passed - Patient is age 18 or older        Passed - No active pregnancy on record        Passed - No positive pregnancy test in last 12 months             Rachelle Vincent RN 05/02/23 9:33 AM  "

## 2023-07-31 DIAGNOSIS — F32.9 REACTIVE DEPRESSION: ICD-10-CM

## 2023-08-01 RX ORDER — SERTRALINE HYDROCHLORIDE 100 MG/1
100 TABLET, FILM COATED ORAL DAILY
Qty: 90 TABLET | Refills: 0 | Status: SHIPPED | OUTPATIENT
Start: 2023-08-01 | End: 2023-11-03

## 2023-08-01 NOTE — TELEPHONE ENCOUNTER
"Routing refill request to provider for review/approval because:  Patient needs to be seen because it has been more than 1 year since last office visit.  PHQ-9 score  is 0 on 7/28/2022    Last Written Prescription Date:  5/2/2023  Last Fill Quantity: 90,  # refills: 0   Last office visit provider:  7/28/2022     Requested Prescriptions   Pending Prescriptions Disp Refills    sertraline (ZOLOFT) 100 MG tablet 90 tablet 0     Sig: Take 1 tablet (100 mg) by mouth daily       SSRIs Protocol Failed - 8/1/2023  7:16 AM        Failed - PHQ-9 score less than 5 in past 6 months     Please review last PHQ-9 score.           Failed - Recent (6 mo) or future (30 days) visit within the authorizing provider's specialty     Patient had office visit in the last 6 months or has a visit in the next 30 days with authorizing provider or within the authorizing provider's specialty.  See \"Patient Info\" tab in inbasket, or \"Choose Columns\" in Meds & Orders section of the refill encounter.            Passed - Medication is active on med list        Passed - Patient is age 18 or older        Passed - No active pregnancy on record        Passed - No positive pregnancy test in last 12 months             Giselle Peters RN 08/01/23 7:16 AM  "

## 2023-11-03 DIAGNOSIS — F32.9 REACTIVE DEPRESSION: ICD-10-CM

## 2023-11-03 RX ORDER — SERTRALINE HYDROCHLORIDE 100 MG/1
100 TABLET, FILM COATED ORAL DAILY
Qty: 90 TABLET | Refills: 0 | Status: SHIPPED | OUTPATIENT
Start: 2023-11-03 | End: 2024-02-02

## 2024-02-02 DIAGNOSIS — F32.9 REACTIVE DEPRESSION: ICD-10-CM

## 2024-02-02 RX ORDER — SERTRALINE HYDROCHLORIDE 100 MG/1
100 TABLET, FILM COATED ORAL DAILY
Qty: 90 TABLET | Refills: 0 | Status: SHIPPED | OUTPATIENT
Start: 2024-02-02

## 2024-03-29 DIAGNOSIS — E78.5 HYPERLIPIDEMIA: ICD-10-CM

## 2024-03-29 RX ORDER — SIMVASTATIN 20 MG
TABLET ORAL
Qty: 90 TABLET | Refills: 3 | Status: SHIPPED | OUTPATIENT
Start: 2024-03-29

## 2024-05-03 ENCOUNTER — TELEPHONE (OUTPATIENT)
Dept: INTERNAL MEDICINE | Facility: CLINIC | Age: 89
End: 2024-05-03
Payer: MEDICARE

## 2024-05-03 DIAGNOSIS — F32.9 REACTIVE DEPRESSION: ICD-10-CM

## 2024-05-03 RX ORDER — SERTRALINE HYDROCHLORIDE 100 MG/1
100 TABLET, FILM COATED ORAL DAILY
Qty: 90 TABLET | Refills: 0 | OUTPATIENT
Start: 2024-05-03

## 2024-05-03 NOTE — TELEPHONE ENCOUNTER
Called and spoke to pt.  Pt doesn't really need this medication.  Pt is aware that for any medication refills, she will need to come in and see PCP.

## 2024-12-06 ENCOUNTER — TELEPHONE (OUTPATIENT)
Dept: INTERNAL MEDICINE | Facility: CLINIC | Age: 89
End: 2024-12-06
Payer: MEDICARE

## 2024-12-06 NOTE — TELEPHONE ENCOUNTER
Medication Question or Refill    Contacts       Contact Date/Time Type Contact Phone/Fax    12/06/2024 03:04 PM CST Phone (Incoming) AbnerTila townsend (Self) [phone] (H)            What medication are you calling about (include dose and sig)?: Lorazepam- 10mg     Preferred Pharmacy:   [facility]/pharmacy #[id number] - [place], [place] - [address] [facility]  [address] [place] [address]  Phone: [phone] Fax: [phone]      Controlled Substance Agreement on file:   CSA -- Patient Level:     [Media Unavailable] Controlled Substance Agreement - Non - Opioid - Scan on 2/6/2020: NON-OPIOID CONTROLLED SUBSTANCE AGREEMENT   [Media Unavailable] Controlled Substance Agreement - Non - Opioid - Scan on 1/14/2019   [Media Unavailable] Controlled Substance Agreement - Opioid - Scan on 11/19/2018   [Media Unavailable] Controlled Substance Agreement - Opioid - Scan on 8/11/2017   [Media Unavailable] Controlled Substance Agreement - Opioid - Scan on 8/30/2016       Who prescribed the medication?:       Do you need a refill? Yes      Patient offered an appointment? No    Do you have any questions or concerns?  Yes: Pt wanted to start using Lorazepam to help with anxiety - pt stated her son is currently on Hospice care at her home and feels this prescription would help.If pt has to come in for an appt she would prefer a phone call or a virtual visit.       Okay to leave a detailed message?: Yes at Cell number on file:    No relevant phone numbers on file. [phone] if [phone] or call daughter Yaneth

## 2024-12-09 DIAGNOSIS — F41.9 ANXIETY: Primary | ICD-10-CM

## 2024-12-09 RX ORDER — LORAZEPAM 0.5 MG/1
0.5 TABLET ORAL DAILY PRN
Qty: 30 TABLET | Refills: 3 | Status: SHIPPED | OUTPATIENT
Start: 2024-12-09

## 2024-12-09 NOTE — TELEPHONE ENCOUNTER
ORazepam (ATIVAN) 1 MG tablet (Discontinued) 90 tablet 0 10/6/2021 7/28/2022 No   Sig: [LORAZEPAM (ATIVAN) 1 MG TABLET] TAKE 1 TABLET BY MOUTH EVERY DAY AS NEEDED FOR ANXIETY   Patient not taking: Reported on 7/28/2022   Sent to pharmacy as: LORazepam 1 MG Oral Tablet (ATIVAN)   Class: E-Prescribe   Order: [id number]   E-Prescribing Status: Receipt confirmed by pharmacy (10/6/2021 12:07 PM CDT)   E-Cancel Status: Request approved by pharmacy (7/28/2022  1:44 PM CDT)

## 2025-01-25 DIAGNOSIS — F32.9 REACTIVE DEPRESSION: ICD-10-CM

## 2025-01-27 RX ORDER — SERTRALINE HYDROCHLORIDE 100 MG/1
100 TABLET, FILM COATED ORAL DAILY
Qty: 90 TABLET | Refills: 0 | OUTPATIENT
Start: 2025-01-27

## 2025-01-27 NOTE — TELEPHONE ENCOUNTER
Patient has not been seen since 2022.      5/3/24 telephone encounter.    Andreia Olsen    5/3/24  9:36 AM  Note  Called and spoke to pt.  Pt doesn't really need this medication.  Pt is aware that for any medication refills, she will need to come in and see PCP.

## 2025-02-17 ENCOUNTER — VIRTUAL VISIT (OUTPATIENT)
Dept: INTERNAL MEDICINE | Facility: CLINIC | Age: 89
End: 2025-02-17
Payer: MEDICARE

## 2025-02-17 DIAGNOSIS — R11.0 NAUSEA: ICD-10-CM

## 2025-02-17 DIAGNOSIS — F32.9 REACTIVE DEPRESSION: ICD-10-CM

## 2025-02-17 DIAGNOSIS — I10 ESSENTIAL HYPERTENSION: ICD-10-CM

## 2025-02-17 DIAGNOSIS — F41.1 GAD (GENERALIZED ANXIETY DISORDER): Primary | ICD-10-CM

## 2025-02-17 DIAGNOSIS — E55.9 VITAMIN D DEFICIENCY: ICD-10-CM

## 2025-02-17 PROCEDURE — 98013 SYNCH AUDIO-ONLY EST LOW 20: CPT | Performed by: INTERNAL MEDICINE

## 2025-02-17 RX ORDER — ONDANSETRON 4 MG/1
4 TABLET, FILM COATED ORAL EVERY 8 HOURS PRN
Qty: 20 TABLET | Refills: 2 | Status: SHIPPED | OUTPATIENT
Start: 2025-02-17

## 2025-02-17 RX ORDER — METOPROLOL SUCCINATE 50 MG/1
50 TABLET, EXTENDED RELEASE ORAL DAILY
Qty: 90 TABLET | Refills: 3 | Status: SHIPPED | OUTPATIENT
Start: 2025-02-17 | End: 2026-02-17

## 2025-02-17 RX ORDER — LORAZEPAM 0.5 MG/1
0.5 TABLET ORAL 2 TIMES DAILY
Qty: 60 TABLET | Refills: 0 | Status: SHIPPED | OUTPATIENT
Start: 2025-02-17 | End: 2025-03-19

## 2025-02-17 RX ORDER — HYDROXYZINE HYDROCHLORIDE 25 MG/1
25 TABLET, FILM COATED ORAL 3 TIMES DAILY PRN
Qty: 40 TABLET | Refills: 2 | Status: SHIPPED | OUTPATIENT
Start: 2025-02-17

## 2025-02-17 RX ORDER — FAMOTIDINE 20 MG/1
20 TABLET, FILM COATED ORAL 2 TIMES DAILY
Qty: 60 TABLET | Refills: 11 | Status: SHIPPED | OUTPATIENT
Start: 2025-02-17 | End: 2026-02-17

## 2025-02-17 RX ORDER — BUSPIRONE HYDROCHLORIDE 10 MG/1
10 TABLET ORAL 3 TIMES DAILY PRN
Qty: 40 TABLET | Refills: 2 | Status: SHIPPED | OUTPATIENT
Start: 2025-02-17

## 2025-02-17 NOTE — PROGRESS NOTES
OFFICE VISIT--Phone    Tila is a 89 year old female being evaluated via a billable phone visit, and would like to be contacted via the following  Home number [phone] (home)    ASSESSMENT and PLAN:  1. CLAUDIA (generalized anxiety disorder) (Primary)  Severe, with trigger of son.  Will allow lorazepam to continue twice daily but will initiate sertraline and other as needed medications.  Good family support  - sertraline (ZOLOFT) 50 MG tablet; Take 1 tablet (50 mg) by mouth daily.  Dispense: 90 tablet; Refill: 3  - LORazepam (ATIVAN) 0.5 MG tablet; Take 1 tablet (0.5 mg) by mouth 2 times daily.  Dispense: 60 tablet; Refill: 0  - busPIRone (BUSPAR) 10 MG tablet; Take 1 tablet (10 mg) by mouth 3 times daily as needed (anxiety).  Dispense: 40 tablet; Refill: 2  - hydrOXYzine HCl (ATARAX) 25 MG tablet; Take 1 tablet (25 mg) by mouth 3 times daily as needed for itching.  Dispense: 40 tablet; Refill: 2    2. Vitamin D deficiency  Deferred at this visit    3. Reactive depression  Recommend sertraline  - sertraline (ZOLOFT) 50 MG tablet; Take 1 tablet (50 mg) by mouth daily.  Dispense: 90 tablet; Refill: 3    4. Essential hypertension  Recommend resumption of metoprolol due to tachycardia and anxiety symptoms  - metoprolol succinate ER (TOPROL XL) 50 MG 24 hr tablet; Take 1 tablet (50 mg) by mouth daily.  Dispense: 90 tablet; Refill: 3    5. Nausea  May have underlying gastritis or ulcer.  Recommend covering GI acidity at least short-term  - ondansetron (ZOFRAN) 4 MG tablet; Take 1 tablet (4 mg) by mouth every 8 hours as needed for nausea.  Dispense: 20 tablet; Refill: 2  - famotidine (PEPCID) 20 MG tablet; Take 1 tablet (20 mg) by mouth 2 times daily.  Dispense: 60 tablet; Refill: 11       Patient Instructions   Clarify and refill lorazepam 0.5 mg twice daily for 1 month    Sertraline 50 mg daily    Metoprolol 50 mg daily    BuSpar and hydroxyzine to take as needed    Famotidine 20 mg twice daily    Zofran 4 mg as  needed    Follow-up with Dr. Diop in 2 to 3 weeks            Return in about 4 weeks (around 3/17/2025) for using a video visit.       CHIEF COMPLAINT:  Chief Complaint   Patient presents with    office visit     Patient reports they are here with anxiety and to refill her lorazapam. Patient has been having extreme anxiety as she has ran out of her lorazepam prescription. Reports she can't eat, sleep, she's vomitting and shaking. Reports her son  recently in her house while on hospice and is grieving. Recently was seen at [facility] for anxiety. No longer takes zoloft but would consider taking it again if it helped anxiety symptoms.         HISTORY OF PRESENT ILLNESS:  Tila is a 89 year old female contacting the clinic today via phone for complaints of anxiety.  Cared for son at home who  of colon cancer in December.  Since then has been very anxious.  Has been provided 2 prescriptions for lorazepam 30 tablets on December 10 and .  Reports that this is very helpful but that she has been taking it twice daily.  Needed to go to the emergency room on February 3.  Basic labs they are normal.  Reports nausea, vomiting, tachycardia, and intense anxiety all of which are relatively well-controlled when she takes the lorazepam twice daily.  Requests a readjustment of her prescription    Discussed that under conditions of stress hyperacidity can occur with symptoms such as esophagitis, gastritis and ulcer.  Recommend covering with H2 blocker    Chart indicates use of sertraline for many years although she reports she has not take it at.  Her daughter who is a psychiatrist recommends that she begin sertraline.  Not sure what the discrepancy is but she agrees    Also discussed use of other medications to take as needed    PDMP reviewed showing no other benzodiazepines or narcotics prior to December    History of Present Illness       Reason for visit:  Anxiety   She is taking medications  "regularly.      REVIEW OF SYSTEMS:  Losing weight and occasionally throwing up    Today's PHQ-2 Score:       7/28/2022     1:05 PM   PHQ-2 ( 1999 [employer])   Q1: Little interest or pleasure in doing things 0    Q2: Feeling down, depressed or hopeless 0    PHQ-2 Score 0   Q1: Little interest or pleasure in doing things Not at all   Q2: Feeling down, depressed or hopeless Not at all   PHQ-2 Score 0       Proxy-reported       PFSH:  Social History     Social History Narrative     2012 (Truman- dementia/etohism) Grew up in [facility]... 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 3 sisters/dad  surgeon. Met  Truman MALCOLM when he was in  Med School in [place] and emigrated to [place] where Truman started psychiatric practice.  Several grown kids including Ronnell, suad Wolfe- psych in [place]. Non smoker/non drinker. 5 grown kids/        TOBACCO USE:  History   Smoking Status    Never   Smokeless Tobacco    Never       VITALS:  There were no vitals filed for this visit.  There were no vitals taken for this visit.   Estimated body mass index is 29.21 kg/m  as calculated from the following:    Height as of 7/28/22: 1.676 m (5' 6\").    Weight as of 7/28/22: 82.1 kg (181 lb).    PHYSICAL EXAM:  (observations via Phone)  Anxious but alert and oriented.  Difficult to interrupt but settled down once she told her story.  Appreciative    MEDICATIONS  Current Outpatient Medications   Medication Sig Dispense Refill    busPIRone (BUSPAR) 10 MG tablet Take 1 tablet (10 mg) by mouth 3 times daily as needed (anxiety). 40 tablet 2    famotidine (PEPCID) 20 MG tablet Take 1 tablet (20 mg) by mouth 2 times daily. 60 tablet 11    hydrOXYzine HCl (ATARAX) 25 MG tablet Take 1 tablet (25 mg) by mouth 3 times daily as needed for itching. 40 tablet 2    LORazepam (ATIVAN) 0.5 MG tablet Take 1 tablet (0.5 mg) by mouth 2 times daily. 60 tablet 0    " "melatonin 5 mg Tab [MELATONIN 5 MG TAB] Take 1 tablet by mouth daily.      metoprolol succinate ER (TOPROL XL) 50 MG 24 hr tablet Take 1 tablet (50 mg) by mouth daily. 90 tablet 3    ondansetron (ZOFRAN) 4 MG tablet Take 1 tablet (4 mg) by mouth every 8 hours as needed for nausea. 20 tablet 2    sertraline (ZOLOFT) 50 MG tablet Take 1 tablet (50 mg) by mouth daily. 90 tablet 3    cyanocobalamin, vitamin B-12, 2,500 mcg Tab [CYANOCOBALAMIN, VITAMIN B-12, 2,500 MCG TAB] Take by mouth. (Patient not taking: Reported on 2/17/2025)      hydrochlorothiazide (HYDRODIURIL) 25 MG tablet [HYDROCHLOROTHIAZIDE (HYDRODIURIL) 25 MG TABLET] TAKE 1 TABLET BY MOUTH EVERY DAY (Patient not taking: Reported on 2/17/2025) 90 tablet 3       Notes summarized: Emergency room  Labs, x-rays, cardiology, GI tests reviewed: PDMP and labs  Imaging:   No results found for this or any previous visit (from the past 744 hours).   No results for input(s): \"HGB\", \"WBC\", \"NA\", \"POTASSIUM\", \"CR\", \"A1C\", \"PSA\", \"URIC\", \"B12\", \"TSH\", \"VITDT\", \"SED\", \"CRPI\" in the last 51004 hours.  No results found for: \"LKEEY25DMG\"  Lab Results   Component Value Date    CHOL 158 05/14/2018     New orders: No orders of the defined types were placed in this encounter.      Independent review of:  Patient would like to receive their AVS by Mail a copy    Phone-Visit Details  Type of service:  Phone Visit      2/17/2025     3:24 PM   Additional Questions   Roomed by Nica   Accompanied by alone         2/17/2025     3:24 PM   Patient Reported Additional Medications   Patient reports taking the following new medications none     Patient has given verbal consent to a Phone visit?  Yes  Telephone visit completed due to the patient did not consent to a video visit.   How would you like to obtain your AVS?  MyChart  Will anyone else be joining your phone visit, giving supplemental history? No    Originating location (pt location): Home    Distant Location (provider location):  " Off-site    Phone Start Time: 4:59 PM  Phone End time:  5:17 PM  Conversation plus orders: 18 minutes  Dictation time:  3 minutes    The visit lasted a total of 21 minutes     Shaquille Chavarria MD  Internal Medicine  [facility]    The ongoing plan of care, for the conditions documented in the note above, were addressed during this visit.  Due to the chronic nature of the issues and uncertain outcome of changes made today, I will continue to support Tila in the ongoing management of these conditions and continuity of care by access to [facility] messages, phone calls, and follow-up appointments.

## 2025-02-17 NOTE — PATIENT INSTRUCTIONS
Clarify and refill lorazepam 0.5 mg twice daily for 1 month    Sertraline 50 mg daily    Metoprolol 50 mg daily    BuSpar and hydroxyzine to take as needed    Famotidine 20 mg twice daily    Zofran 4 mg as needed    Follow-up with Dr. Diop in 2 to 3 weeks

## 2025-02-17 NOTE — PROGRESS NOTES
"Tila is a 89 year old who is being evaluated via a billable telephone visit.    {ROOMING STAFF complete during rooming of virtual visit (Optional):858732}  Originating Location (pt. Location): {patient location:913172::\"Home\"}  {PROVIDER LOCATION On-site should be selected for visits conducted from your clinic location or adjoining [facility] hospital, academic office, or other nearby [facility] building. Off-site should be selected for all other provider locations, including home:803851}  Distant Location (provider location):  {virtual location provider:657528}  Telephone visit completed due to {audio only reason:083221}    {PROVIDER CHARTING PREFERENCE:176247}    Subjective   Tila is a 89 year old, presenting for the following health issues:  office visit (Patient reports they are here with anxiety and to refill her lorazapam. Patient has been having extreme anxiety as she has ran out of her lorazepam prescription. Reports she can't eat, sleep, she's vomitting and shaking. Reports her son  recently in her house while on hospice and is grieving. Recently was seen at [facility] for anxiety. No longer takes zoloft but would consider taking it again if it helped anxiety symptoms.  )        2025     3:24 PM   Additional Questions   Roomed by Nica   Accompanied by alone         2025     3:24 PM   Patient Reported Additional Medications   Patient reports taking the following new medications none     History of Present Illness       Reason for visit:  Anxiety   She is taking medications regularly.       {SUPERLIST (Optional):383964}  {additonal problems for provider to add (Optional):776709}    {ROS Picklists (Optional):927257}      Objective    Vitals - Patient Reported  Pain Score: No Pain (0)        Physical Exam   General: Alert and no distress //Respiratory: No audible wheeze, cough, or shortness of breath // Psychiatric:  Appropriate affect, tone, and pace of words      {Diagnostic Test Results " (Optional):618575}      Phone call duration: *** minutes  Signed Electronically by: Shaquille Chavarria MD  {Email feedback regarding this note to [email or web address]   :384225}

## 2025-02-19 ENCOUNTER — TELEPHONE (OUTPATIENT)
Dept: INTERNAL MEDICINE | Facility: CLINIC | Age: 89
End: 2025-02-19
Payer: MEDICARE

## 2025-02-19 NOTE — TELEPHONE ENCOUNTER
"Patient's daughter, Yaneth, calls regarding lorazepam. States this was sent in by Dr. Chavarria 2/17/25 for increased dose of lorazepam twice daily. Yaneth states pharmacy is unable to fill until 2/21/25.     Per Chart Review, Virtual Visit with Dr. Chavarria 2/17/25 states   \"1. CLAUDIA (generalized anxiety disorder) (Primary)  Severe, with trigger of son.  Will allow lorazepam to continue twice daily but will initiate sertraline and other as needed medications.  Good family support\"    Spoke with Candy at Pharmacy who would need approval for early refill today.     "

## 2025-02-19 NOTE — TELEPHONE ENCOUNTER
Spoke with Candy at pharmacy to relay Dr. Chavarria's approval for early fill. She verbalized understanding.     Updated patient's daughter, Yaneth, that pharmacy will be able to fill requested prescription today. Yaneth verbalized understanding and will notify her mother. She is appreciative.

## 2025-02-19 NOTE — TELEPHONE ENCOUNTER
I am not sure why, but the person that I talk to was rude to me today. It is really hard to discourage them from doing it for they have to cover themselves (document). Sorry that you have to deal with such things everyday.    Thanks,  Nadine

## 2025-02-19 NOTE — CONFIDENTIAL NOTE
If I had a visit with her, and checked the PDMP, and sent in a fresh prescription, then overtly and covertly, I am saying she can have the prescription now regardless of what she had before    If we need to repeat this to the pharmacy then okay.  If they are calling to check and our nurses can review the note and confirm that would seem to be enough    Will also ask Pharm.D. to comment since this new rash of pharmacy requests to clarify okay for early refill for travel, or dosage change or instruction change etc. is unusual and seems to be a change in policy or enforcement    When I send in a prescription I am saying it is okay to take the medicine.  If I have changed the instructions, that would seem to be a reason to fill the prescription early.  This is not about me and the pharmacy.  This seems to be about the insurance company and the pharmacy but I am eager to be educated on what is different now versus the last 35 years    Please let me know because this is about the fifth time that this has happened in the last week

## 2025-02-19 NOTE — TELEPHONE ENCOUNTER
Considering it is controlled medication, there are limitations that patient will refill early. If early refill is needed, then the provider should approve it either in a written form, electronic or verbal, and pharmacist dispensing should document it.  I don't believe this is a new thing.  Hope this helps.    Thanks,  Nadine